# Patient Record
Sex: MALE | Race: WHITE | Employment: FULL TIME | URBAN - METROPOLITAN AREA
[De-identification: names, ages, dates, MRNs, and addresses within clinical notes are randomized per-mention and may not be internally consistent; named-entity substitution may affect disease eponyms.]

---

## 2019-10-24 ENCOUNTER — OFFICE VISIT (OUTPATIENT)
Dept: INTERNAL MEDICINE CLINIC | Facility: CLINIC | Age: 54
End: 2019-10-24

## 2019-10-24 VITALS
RESPIRATION RATE: 18 BRPM | BODY MASS INDEX: 33.03 KG/M2 | WEIGHT: 223 LBS | HEART RATE: 79 BPM | OXYGEN SATURATION: 97 % | HEIGHT: 69 IN | SYSTOLIC BLOOD PRESSURE: 122 MMHG | TEMPERATURE: 98 F | DIASTOLIC BLOOD PRESSURE: 70 MMHG

## 2019-10-24 DIAGNOSIS — E66.9 OBESITY (BMI 30-39.9): ICD-10-CM

## 2019-10-24 DIAGNOSIS — R03.0 ELEVATED BLOOD PRESSURE READING WITHOUT DIAGNOSIS OF HYPERTENSION: ICD-10-CM

## 2019-10-24 DIAGNOSIS — J30.1 SEASONAL ALLERGIC RHINITIS DUE TO POLLEN: ICD-10-CM

## 2019-10-24 DIAGNOSIS — Z00.00 ROUTINE GENERAL MEDICAL EXAMINATION AT A HEALTH CARE FACILITY: ICD-10-CM

## 2019-10-24 DIAGNOSIS — Z23 ENCOUNTER FOR IMMUNIZATION: ICD-10-CM

## 2019-10-24 DIAGNOSIS — Z11.59 NEED FOR HEPATITIS C SCREENING TEST: ICD-10-CM

## 2019-10-24 DIAGNOSIS — Z76.89 ENCOUNTER TO ESTABLISH CARE: Primary | ICD-10-CM

## 2019-10-24 DIAGNOSIS — Z12.5 SCREENING FOR PROSTATE CANCER: ICD-10-CM

## 2019-10-24 DIAGNOSIS — K43.9 ABDOMINAL WALL HERNIA: ICD-10-CM

## 2019-10-24 DIAGNOSIS — G45.8 SUBCLAVIAN STEAL SYNDROME: ICD-10-CM

## 2019-10-24 DIAGNOSIS — Z23 NEED FOR SHINGLES VACCINE: ICD-10-CM

## 2019-10-24 RX ORDER — FLUTICASONE PROPIONATE 50 MCG
SPRAY, SUSPENSION (ML) NASAL
COMMUNITY

## 2019-10-24 NOTE — PROGRESS NOTES
CC:  Chief Complaint   Patient presents with   [address] [facility] Immunization/Injection       HISTORY OF PRESENT ILLNESS  Jhonatan Gross is a 53 y.o. male. Presents to [facility] care. He has seasonal allergic rhinitis. Is concerned about his BP. 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 He denies headaches, CP, or SOB. Reports that his BP is usually lower on one side compared to the other. Denies past history of elevated BP or known family history of HTN. Has a maternal uncle with heart disease and his half-sister had a MI. He complains of left arm pain when he moves left arm back for past year. Felt a pop when he reached back with left arm about a year ago; thinks he may have injured his biceps. Had sebaceous cyst removed from his right shoulder a few years ago and now feels like it is coming back. Soc Hx  Single. Lives with girlfriend. Has no children. Works as a  for [employer]. Never smoker. Drinks 4-6 beers a week. Denies recreational drug use. No regular exercise. Trying to eat healthier. Drinks 3-4 cups of coffee a day. Health Maintenance  Flu vaccine: declined  Tetanus vaccine:  Tdap 10/24/19     Zoster vaccine: discussed, had shingles before, will check on vaccine at his pharmacy  Colonoscopy: due for this  Lipids: will check today  A1c: will check today  Advanced Directives: given information    End of Life: given information      ROS  Constitutional: negative for fevers, chills, night sweats, anorexia and weight loss  Eyes:   negative for visual disturbance and irritation  ENT:   negative for tinnitus, sore throat, nasal congestion, ear pains, hoarseness  Respiratory:  negative for cough, hemoptysis, dyspnea,wheezing  CV:   negative for chest pain, palpitations, lower extremity edema  GI:   negative for nausea, vomiting, diarrhea, constipation, abd pain, melena  Endo: negative for polyuria, polydipsia, polyphagia, cold/heat intolerance  Genitourinary: negative for frequency, dysuria and hematuria  Integument:  negative for rash and pruritus  Hematologic:  negative for easy bruising and gum/nose bleeding  Musculoskel: negative for myalgias, arthralgias, back pain, muscle weakness, joint pain  Neurological:  negative for headaches, dizziness, vertigo, memory problems and gait   Behavl/Psych: negative for feelings of anxiety, depression, mood change      No past medical history on file. Past Surgical History:   Procedure Laterality Date    HX CATARACT REMOVAL Right 06/2018    HX CATARACT REMOVAL Left 2011     Social History     Socioeconomic History    Marital status: SINGLE     Spouse name: Not on file    Number of children: Not on file    Years of education: Not on file    Highest education level: Not on file   Tobacco Use    Smoking status: Never Smoker    Smokeless tobacco: Never Used   Substance and Sexual Activity    Alcohol use: Yes     Frequency: 2-3 times a week     Drinks per session: 3 or 4     Binge frequency: Never     Comment: up to 4 or 5 beers on weekends    Drug use: Never     Family History   Problem Relation Age of Onset    Cancer Mother         lung ca    Heart Attack Sister         half sisters     Allergies   Allergen Reactions    Latex Rash     Current Outpatient Medications   Medication Sig Dispense Refill    fluticasone propionate (FLONASE ALLERGY RELIEF) 50 mcg/actuation nasal spray by Nasal route. PHYSICAL EXAM  Visit Vitals  /68 (BP 1 Location: Left arm, BP Patient Position: Sitting)   Pulse 79   Temp 98 °F (36.7 °C) (Oral)   Resp 18   Ht 5' 9\" (1.753 m)   Wt 223 lb (101.2 kg)   SpO2 97%   BMI 32.93 kg/m²   BP at right arm 147/79, BP at left arm 122/70    General: Obese. In no distress. Alert and oriented to person, place, and time. Head: Normocephalic, atraumatic. Eyes: Conjunctiva clear.  Pupils equal, round, reactive to light. Extraocular movements intact. Ears/Nose: Normal nasal mucosa. Mouth/Throat: Oropharynx benign. Neck: Supple, no lymphadenopathy, no carotid bruits, no JVD, thyroid: not enlarged, symmetric, no tenderness/mass/nodules. Lungs: Clear to auscultation bilaterally. No crackles or wheezes. Chest Wall: No tenderness or deformity. Heart: RRR, normal S1 and S2, no murmur, click, rub, or gallop. Back: ROM normal. No CVA tenderness. Abdomen: Soft, non-distended, bowel sounds normal. No tenderness. Large non-tender right-sided mass when he sits up. No hepatosplenomegaly. Lymph nodes: Cervical and supraclavicular nodes normal.  Extremities: No clubbing, cyanosis, or edema. Skin: Skin color, texture, turgor normal. Soft non-tender mobile 1 cm subcutaneous cyst at top of right shoulder. Pulses: 2+ and symmetric at dorsalis pedis and posterior tibialis pulses. Neurological: CN II-XII intact. Normal strength, sensation, and reflexes throughout. Musculoskeletal: Gait normal. ROM normal at both knees. No left arm tenderness. Mildly decreased left shoulder external rotation. Psychiatric: Normal mood and affect. Behavior is normal.          ASSESSMENT AND PLAN    ICD-10-CM ICD-9-CM    1. Encounter to establish care Z76.89 V65.8    2. Elevated blood pressure reading without diagnosis of hypertension R03.0 796.2    3. Obesity (BMI 30-39. 9) E66.9 278.00    4. Seasonal allergic rhinitis due to pollen J30.1 477.0    5. Routine general medical examination at a health care facility Z00.00 V70.0 LIPID PANEL      METABOLIC PANEL, COMPREHENSIVE      CBC WITH AUTOMATED DIFF      HEMOGLOBIN A1C WITH EAG      TSH RFX ON ABNORMAL TO FREE T4   6. Abdominal wall hernia K43.9 553.20 US ABD COMP   7. Subclavian steal syndrome G45.8 435.2 DUPLEX UPPER EXT ARTERY BILAT   8. Need for hepatitis C screening test Z11.59 V73.89 HEPATITIS C AB   9. Need for shingles vaccine Z23 V04.89    10.  Screening for prostate cancer Z12.5 V76.44 PSA SCREENING (SCREENING)   11. Encounter for immunization Z23 V03.89 TETANUS, DIPHTHERIA TOXOIDS AND ACELLULAR PERTUSSIS VACCINE (TDAP), IN INDIVIDS. >=7, IM      [place] IMMUNIZ ADMIN,1 SINGLE/COMB VAC/TOXOID     Diagnoses and all orders for this visit:    1. Encounter to establish care    2. Elevated blood pressure reading without diagnosis of hypertension  BP normal at 122/70 but     3. Obesity (BMI 30-39. 9)  Discussed the patient's BMI with him. The BMI follow up plan is as follows: dietary management education, guidance, and counseling; encourage exercise; monitor weight; prescribed dietary intake. Follow up BMI in 1 month. 4. Seasonal allergic rhinitis due to pollen  Controlled on Flonase nasal spray. 5. Routine general medical examination at a health care facility  -     LIPID PANEL  -     METABOLIC PANEL, COMPREHENSIVE  -     CBC WITH AUTOMATED DIFF  -     HEMOGLOBIN A1C WITH EAG  -     TSH RFX ON ABNORMAL TO FREE T4    6. Abdominal wall hernia  Rule out right-sided abdominal wall hernia versus loose rectus muscles. -     US ABD COMP; Future    7. Subclavian steal syndrome  Rule out subclavian stenosis based on BP discrepancy between arms and left arm pain (claudication?). -     DUPLEX UPPER EXT ARTERY BILAT; Future    8. Need for hepatitis C screening test  He was born in the window between [facility] and is a candidate for Hepatitis C screening.   -     HEPATITIS C AB    9. Need for shingles vaccine    10. Screening for prostate cancer  -     PSA SCREENING (SCREENING)    11. Encounter for immunization  -     TETANUS, [address] [place] [address] (TDAP), IN INDIVIDS. >=7, IM  -     [place] IMMUNIZ ADMIN,1 SINGLE/COMB VAC/TOXOID      Plan to refer for colonoscopy at next clinic visit. Follow-up and Dispositions    · Return in about 1 month (around 11/24/2019), or if symptoms worsen or fail to improve, for Elevated BP.          I have discussed the diagnosis with the patient and the intended plan as seen in the above orders. Patient is in agreement. The patient has received an after-visit summary and questions were answered concerning future plans. I have discussed medication side effects and warnings with the patient as well.

## 2019-10-24 NOTE — PATIENT INSTRUCTIONS
Vaccine Information Statement Tdap (Tetanus, Diphtheria, Pertussis) Vaccine: What You Need to Know Many Vaccine Information Statements are available in [place] and other languages. See www.immunize.org/vis. Hojas de Información Sobre Vacunas están disponibles en español y en muchos otros idiomas. Visite Duncan[employer].si 1. Why get vaccinated? Tetanus, diphtheria, and pertussis are very serious diseases. Tdap vaccine can protect us from these diseases. And, Tdap vaccine given to pregnant women can protect  babies against pertussis. TETANUS (Lockjaw) is rare in the [facility] today. It causes painful muscle tightening and stiffness, usually all over the body. ? It can lead to tightening of muscles in the head and neck so you cant open your mouth, swallow, or sometimes even breathe. Tetanus kills about 1 out of 10 people who are infected even after receiving the best medical care. DIPHTHERIA is also rare in the [facility] today. It can cause a thick coating to form in the back of the throat. ? It can lead to breathing problems, heart failure, paralysis, and death. PERTUSSIS (Whooping Cough) causes severe coughing spells, which can cause difficulty breathing, vomiting, and disturbed sleep. ? It can also lead to weight loss, incontinence, and rib fractures. Up to 2 in 100 adolescents and 5 in 100 adults with pertussis are hospitalized or have complications, which could include pneumonia or death. These diseases are caused by bacteria. Diphtheria and pertussis are spread from person to person through secretions from coughing or sneezing. Tetanus enters the body through cuts, scratches, or wounds. Before vaccines, as many as 200,000 cases of diphtheria, 200,000 cases of pertussis, and hundreds of cases of tetanus, were reported in the [place] each year.  Since vaccination began, reports of cases for tetanus and diphtheria have dropped by about 99% and for pertussis by about 80%. 2. Tdap vaccine Tdap vaccine can protect adolescents and adults from tetanus, diphtheria, and pertussis. One dose of Tdap is routinely given at age 6 or 15. People who did not get Tdap at that age should get it as soon as possible. Tdap is especially important for health care professionals and anyone having close contact with a baby younger than 12 months. Pregnant women should get a dose of Tdap during every pregnancy, to protect the  from pertussis. Infants are most at risk for severe, life-threatening complications from pertussis. Another vaccine, called Td, protects against tetanus and diphtheria, but not pertussis. A Td booster should be given every 10 years. Tdap may be given as one of these boosters if you have never gotten Tdap before. Tdap may also be given after a severe cut or burn to prevent tetanus infection. Your doctor or the person giving you the vaccine can give you more information. Tdap may safely be given at the same time as other vaccines. 3. Some people should not get this vaccine  A person who has ever had a life-threatening allergic reaction after a previous dose of any diphtheria, tetanus or pertussis containing vaccine, OR has a severe allergy to any part of this vaccine, should not get Tdap vaccine. Tell the person giving the vaccine about any severe allergies.  Anyone who had coma or long repeated seizures within 7 days after a childhood dose of DTP or DTaP, or a previous dose of Tdap, should not get Tdap, unless a cause other than the vaccine was found. They can still get Td.  Talk to your doctor if you: 
- have seizures or another nervous system problem, 
- had severe pain or swelling after any vaccine containing diphtheria, tetanus or pertussis,  
- ever had a condition called Guillain Barré Syndrome (GBS), 
- arent feeling well on the day the shot is scheduled. 4. Risks With any medicine, including vaccines, there is a chance of side effects. These are usually mild and go away on their own. Serious reactions are also possible but are rare. Most people who get Tdap vaccine do not have any problems with it. Mild Problems following Tdap 
(Did not interfere with activities)  Pain where the shot was given (about 3 in 4 adolescents or 2 in 3 adults)  Redness or swelling where the shot was given (about 1 person in 5)  Mild fever of at least 100.4°F (up to about 1 in 25 adolescents or 1 in 100 adults)  Headache (about 3 or 4 people in 10)  Tiredness (about 1 person in 3 or 4)  Nausea, vomiting, diarrhea, stomach ache (up to 1 in 4 adolescents or 1 in 10 adults)  Chills,  sore joints (about 1 person in 10)  Body aches (about 1 person in 3 or 4)  Rash, swollen glands (uncommon) Moderate Problems following Tdap (Interfered with activities, but did not require medical attention)  Pain where the shot was given (up to 1 in 5 or 6)  Redness or swelling where the shot was given (up to about 1 in 16 adolescents or 1 in 12 adults)  Fever over 102°F (about 1 in 100 adolescents or 1 in 250 adults)  Headache (about 1 in 7 adolescents or 1 in 10 adults)  Nausea, vomiting, diarrhea, stomach ache (up to 1 or 3 people in 100)  Swelling of the entire arm where the shot was given (up to about 1 in 500). Severe Problems following Tdap 
(Unable to perform usual activities; required medical attention)  Swelling, severe pain, bleeding, and redness in the arm where the shot was given (rare). Problems that could happen after any vaccine:  People sometimes faint after a medical procedure, including vaccination. Sitting or lying down for about 15 minutes can help prevent fainting, and injuries caused by a fall. Tell your doctor if you feel dizzy, or have vision changes or ringing in the ears.  Some people get severe pain in the shoulder and have difficulty moving the arm where a shot was given. This happens very rarely.  Any medication can cause a severe allergic reaction. Such reactions from a vaccine are very rare, estimated at fewer than 1 in a million doses, and would happen within a few minutes to a few hours after the vaccination. As with any medicine, there is a very remote chance of a vaccine causing a serious injury or death. The safety of vaccines is always being monitored. For more information, visit: www.cdc.gov/vaccinesafety/ 
 
5. What if there is a serious problem? What should I look for?  Look for anything that concerns you, such as signs of a severe allergic reaction, very high fever, or unusual behavior.  Signs of a severe allergic reaction can include hives, swelling of the face and throat, difficulty breathing, a fast heartbeat, dizziness, and weakness. These would usually start a few minutes to a few hours after the vaccination. What should I do?  If you think it is a severe allergic reaction or other emergency that cant wait, call 9-1-1 or get the person to the nearest hospital. Otherwise, call your doctor.  Afterward, the reaction should be reported to the Vaccine Adverse Event Reporting System (VAERS). Your doctor might file this report, or you can do it yourself through the VAERS web site at www.vaers. [facility].gov, or by calling 2-279.779.8049. VAERS does not give medical advice. 6. The National Vaccine Injury Compensation Program 
 
The [employer] Vaccine Injury Compensation Program (VICP) is a federal program that was created to compensate people who may have been injured by certain vaccines. Persons who believe they may have been injured by a vaccine can learn about the program and about filing a claim by calling 3-377.988.8229 or visiting the [employer] website at www.[facility].gov/vaccinecompensation.  There is a time limit to file a claim for compensation. 7. How can I learn more?  Ask your doctor. He or she can give you the vaccine package insert or suggest other sources of information.  Call your local or state health department.  Contact the Centers for Disease Control and Prevention (CDC): 
- Call 5-913.966.7505 (1-800-CDC-INFO) or 
- Visit CDCs website at www.cdc.gov/vaccines Vaccine Information Statement Tdap Vaccine 
(2/24/2015) [address] 329RG-87 Department of [facility] and [place] Centers for Disease Control and Prevention Office Use Only

## 2019-10-24 NOTE — PROGRESS NOTES
Ned Telles  Identified pt with two pt identifiers(name and ). Chief Complaint   Patient presents with   [address] [facility] [address] Immunization/Injection   Per Dr. Coombs Every verbal order read back orders placed for TDAP. Declines flu vaccien      Patient received TDAP 0.5 ml  in right deltoid IM. Patient tolerated procedure without complaints and received VIS. 1. Have you been to the ER, urgent care clinic since your last visit? Hospitalized since your last visit? NO    2. Have you seen or consulted any other health care providers outside of the [address] [place] [address] since your last visit? Include any pap smears or colon screening. NO    Today's provider has been notified of reason for visit, vitals and flowsheets obtained on patients.        Reviewed record In preparation for visit, huddled with provider and have obtained necessary documentation      Health Maintenance Due   Topic    Hepatitis C Screening     DTaP/Tdap/Td series (1 - Tdap)    Shingrix Vaccine Age 50> (1 of 2)    FOBT Q 1 YEAR AGE 50-75     Influenza Age 5 to Adult        Wt Readings from Last 3 Encounters:   10/24/19 223 lb (101.2 kg)     Temp Readings from Last 3 Encounters:   10/24/19 98 °F (36.7 °C) (Oral)     BP Readings from Last 3 Encounters:   10/24/19 133/68     Pulse Readings from Last 3 Encounters:   10/24/19 79     Vitals:    10/24/19 0823   BP: 133/68   Pulse: 79   Resp: 18   Temp: 98 °F (36.7 °C)   TempSrc: Oral   SpO2: 97%   Weight: 223 lb (101.2 kg)   Height: 5' 9\" (1.753 m)   PainSc:   3   PainLoc: Arm         Learning Assessment:  :     Learning Assessment 10/24/2019   PRIMARY LEARNER Patient   PRIMARY LANGUAGE ENGLISH   LEARNER PREFERENCE PRIMARY LISTENING   ANSWERED BY patient s   RELATIONSHIP SELF       Depression Screening:  :     3 most recent PHQ Screens 10/24/2019   Little interest or pleasure in doing things Not at all   Feeling down, depressed, irritable, or hopeless Not at all   Total Score PHQ 2 0       Fall Risk Assessment:  :     Fall Risk Assessment, last 12 mths 10/24/2019   Able to walk? Yes   Fall in past 12 months? No       Abuse Screening:  :     Abuse Screening Questionnaire 10/24/2019   Do you ever feel afraid of your partner? N   Are you in a relationship with someone who physically or mentally threatens you? N   Is it safe for you to go home? Y       ADL Screening:  :     ADL Assessment 10/24/2019   Feeding yourself No Help Needed   Getting from bed to chair No Help Needed   Getting dressed No Help Needed   Bathing or showering No Help Needed   Walk across the room (includes cane/walker) No Help Needed   Using the telphone No Help Needed   Taking your medications No Help Needed   Preparing meals No Help Needed   Managing money (expenses/bills) No Help Needed   Moderately strenuous housework (laundry) No Help Needed   Shopping for personal items (toiletries/medicines) No Help Needed   Shopping for groceries No Help Needed   Driving No Help Needed   Climbing a flight of stairs No Help Needed   Getting to places beyond walking distances No Help Needed                 Medication reconciliation up to date and corrected with patient at this time.

## 2019-10-25 LAB
ALBUMIN SERPL-MCNC: 4.4 G/DL (ref 3.5–5.5)
ALBUMIN/GLOB SERPL: 1.5 {RATIO} (ref 1.2–2.2)
ALP SERPL-CCNC: 91 IU/L (ref 39–117)
ALT SERPL-CCNC: 16 IU/L (ref 0–44)
AST SERPL-CCNC: 16 IU/L (ref 0–40)
BASOPHILS # BLD AUTO: 0 X10E3/UL (ref 0–0.2)
BASOPHILS NFR BLD AUTO: 0 %
BILIRUB SERPL-MCNC: 0.5 MG/DL (ref 0–1.2)
BUN SERPL-MCNC: 16 MG/DL (ref 6–24)
BUN/CREAT SERPL: 19 (ref 9–20)
CALCIUM SERPL-MCNC: 9.5 MG/DL (ref 8.7–10.2)
CHLORIDE SERPL-SCNC: 99 MMOL/L (ref 96–106)
CHOLEST SERPL-MCNC: 240 MG/DL (ref 100–199)
CO2 SERPL-SCNC: 27 MMOL/L (ref 20–29)
CREAT SERPL-MCNC: 0.84 MG/DL (ref 0.76–1.27)
EOSINOPHIL # BLD AUTO: 0.1 X10E3/UL (ref 0–0.4)
EOSINOPHIL NFR BLD AUTO: 2 %
ERYTHROCYTE [DISTWIDTH] IN BLOOD BY AUTOMATED COUNT: 12.5 % (ref 12.3–15.4)
EST. AVERAGE GLUCOSE BLD GHB EST-MCNC: 229 MG/DL
GLOBULIN SER CALC-MCNC: 2.9 G/DL (ref 1.5–4.5)
GLUCOSE SERPL-MCNC: 204 MG/DL (ref 65–99)
HBA1C MFR BLD: 9.6 % (ref 4.8–5.6)
HCT VFR BLD AUTO: 45 % (ref 37.5–51)
HCV AB S/CO SERPL IA: <0.1 S/CO RATIO (ref 0–0.9)
HDLC SERPL-MCNC: 39 MG/DL
HGB BLD-MCNC: 15.5 G/DL (ref 13–17.7)
IMM GRANULOCYTES # BLD AUTO: 0 X10E3/UL (ref 0–0.1)
IMM GRANULOCYTES NFR BLD AUTO: 0 %
LDLC SERPL CALC-MCNC: 167 MG/DL (ref 0–99)
LYMPHOCYTES # BLD AUTO: 1.3 X10E3/UL (ref 0.7–3.1)
LYMPHOCYTES NFR BLD AUTO: 19 %
MCH RBC QN AUTO: 29.8 PG (ref 26.6–33)
MCHC RBC AUTO-ENTMCNC: 34.4 G/DL (ref 31.5–35.7)
MCV RBC AUTO: 87 FL (ref 79–97)
MONOCYTES # BLD AUTO: 0.6 X10E3/UL (ref 0.1–0.9)
MONOCYTES NFR BLD AUTO: 8 %
NEUTROPHILS # BLD AUTO: 4.8 X10E3/UL (ref 1.4–7)
NEUTROPHILS NFR BLD AUTO: 71 %
PLATELET # BLD AUTO: 180 X10E3/UL (ref 150–450)
POTASSIUM SERPL-SCNC: 4.6 MMOL/L (ref 3.5–5.2)
PROT SERPL-MCNC: 7.3 G/DL (ref 6–8.5)
PSA SERPL-MCNC: 1.7 NG/ML (ref 0–4)
RBC # BLD AUTO: 5.2 X10E6/UL (ref 4.14–5.8)
SODIUM SERPL-SCNC: 139 MMOL/L (ref 134–144)
TRIGL SERPL-MCNC: 168 MG/DL (ref 0–149)
TSH SERPL DL<=0.005 MIU/L-ACNC: 0.89 UIU/ML (ref 0.45–4.5)
VLDLC SERPL CALC-MCNC: 34 MG/DL (ref 5–40)
WBC # BLD AUTO: 6.8 X10E3/UL (ref 3.4–10.8)

## 2019-10-29 NOTE — PROGRESS NOTES
Your labs showed normal kidney and liver tests, blood counts, thyroid, prostate blood test (PSA), and hepatitis C screening test.  Your blood sugar was high, and your diabetes screening test showed you have full-blown type 2 diabetes (A1c 9.6%). Your average blood glucose over the past 3 months was 229; normal is less than 130. Your cholesterol was high at 240, normal is less than 200. Dr. Bin Henry would like to see you back in clinic next week to discuss these lab results more and to discuss the results of your ultrasound studies.

## 2019-10-31 ENCOUNTER — HOSPITAL ENCOUNTER (OUTPATIENT)
Dept: ULTRASOUND IMAGING | Age: 54
Discharge: HOME | End: 2019-10-31
Attending: INTERNAL MEDICINE
Payer: COMMERCIAL

## 2019-10-31 ENCOUNTER — HOSPITAL ENCOUNTER (OUTPATIENT)
Dept: VASCULAR SURGERY | Age: 54
Discharge: HOME | End: 2019-10-31
Attending: INTERNAL MEDICINE
Payer: COMMERCIAL

## 2019-10-31 DIAGNOSIS — K43.9 ABDOMINAL WALL HERNIA: ICD-10-CM

## 2019-10-31 DIAGNOSIS — G45.8 SUBCLAVIAN STEAL SYNDROME: ICD-10-CM

## 2019-10-31 LAB
LEFT ARM BP: 150 MMHG
LEFT DIST BRACHIAL A EDV: 0 CM/S
LEFT DIST BRACHIAL A PSV: 81.3 CM/S
LEFT DIST RADIAL A PSV: 99 CM/S
LEFT DIST ULNAR A PSV: 104.2 CM/S
LEFT PROX BRACHIAL A EDV: 10.5 CM/S
LEFT PROX BRACHIAL A PSV: 97.3 CM/S
LEFT SUBCLAVIAN DIAS: 10 CM/S
LEFT SUBCLAVIAN SYS: 97.2 CM/S
RIGHT ARM BP: 140 MMHG
RIGHT DIST BRACHIAL A EDV: 0 CM/S
RIGHT DIST BRACHIAL A PSV: 78.1 CM/S
RIGHT DIST RADIAL A PSV: 87.5 CM/S
RIGHT DIST ULNAR A PSV: 79.7 CM/S
RIGHT MID AX A PSV: 66 CM/S
RIGHT PROX BRACHIAL A EDV: 0 CM/S
RIGHT PROX BRACHIAL A PSV: 72.9 CM/S

## 2019-10-31 PROCEDURE — 93930 UPPER EXTREMITY STUDY: CPT

## 2019-10-31 PROCEDURE — 76705 ECHO EXAM OF ABDOMEN: CPT

## 2019-11-01 NOTE — PROGRESS NOTES
Message sent to patient by My Chart:  The ultrasound of your upper extremities showed no blockages in the arteries to explain the difference in the blood pressure readings in your arms or to explain the left arm pain.     Dr. Moshe Mcwilliams

## 2019-11-01 NOTE — PROGRESS NOTES
Message sent to patient by My Chart:  The ultrasound of your abdomen showed you have a wide mouth ventral hernia. It also showed possible breakdown in the abdominal wall. Let me know if you are having any pain in the area. If so, you need to see a surgeon for possible surgical repair. If not, we can just keep an eye on it for now.     Dr. Levy Robbins

## 2019-11-05 NOTE — PROGRESS NOTES
Spoke with patient and after verifying name and date of birth of patient gave patient test results and any instructions in note per provider. Patient stated understanding. Scheduled follow up appointment for patient soon as he is able to come in to office.

## 2019-11-13 ENCOUNTER — OFFICE VISIT (OUTPATIENT)
Dept: INTERNAL MEDICINE CLINIC | Facility: CLINIC | Age: 54
End: 2019-11-13

## 2019-11-13 VITALS
HEART RATE: 85 BPM | SYSTOLIC BLOOD PRESSURE: 134 MMHG | WEIGHT: 222.6 LBS | HEIGHT: 69 IN | RESPIRATION RATE: 18 BRPM | DIASTOLIC BLOOD PRESSURE: 77 MMHG | OXYGEN SATURATION: 98 % | BODY MASS INDEX: 32.97 KG/M2 | TEMPERATURE: 98.3 F

## 2019-11-13 DIAGNOSIS — E66.9 OBESITY (BMI 30-39.9): ICD-10-CM

## 2019-11-13 DIAGNOSIS — K43.9 ABDOMINAL WALL HERNIA: ICD-10-CM

## 2019-11-13 DIAGNOSIS — E78.2 MIXED HYPERLIPIDEMIA: ICD-10-CM

## 2019-11-13 DIAGNOSIS — E11.9 TYPE 2 DIABETES MELLITUS WITHOUT COMPLICATION, WITHOUT LONG-TERM CURRENT USE OF INSULIN (HCC): Primary | ICD-10-CM

## 2019-11-13 DIAGNOSIS — R03.0 ELEVATED BLOOD PRESSURE READING WITHOUT DIAGNOSIS OF HYPERTENSION: ICD-10-CM

## 2019-11-13 LAB
GLUCOSE POC: 197 MG/DL
HBA1C MFR BLD HPLC: 9.3 %

## 2019-11-13 RX ORDER — METFORMIN HYDROCHLORIDE 500 MG/1
500 TABLET, EXTENDED RELEASE ORAL
Qty: 30 TAB | Refills: 1 | Status: SHIPPED | OUTPATIENT
Start: 2019-11-13 | End: 2020-03-17 | Stop reason: SDUPTHER

## 2019-11-13 NOTE — PATIENT INSTRUCTIONS
Learning About Meal Planning for Diabetes Why plan your meals? Meal planning can be a key part of managing diabetes. Planning meals and snacks with the right balance of carbohydrate, protein, and fat can help you keep your blood sugar at the target level you set with your doctor. You don't have to eat special foods. You can eat what your family eats, including sweets once in a while. But you do have to pay attention to how often you eat and how much you eat of certain foods. You may want to work with a dietitian or a certified diabetes educator. He or she can give you tips and meal ideas and can answer your questions about meal planning. This health professional can also help you reach a healthy weight if that is one of your goals. What plan is right for you? Your dietitian or diabetes educator may suggest that you start with the plate format or carbohydrate counting. The plate format The plate format is a simple way to help you manage how you eat. You plan meals by learning how much space each food should take on a plate. Using the plate format helps you spread carbohydrate throughout the day. It can make it easier to keep your blood sugar level within your target range. It also helps you see if you're eating healthy portion sizes. To use the plate format, you put non-starchy vegetables on half your plate. Add meat or meat substitutes on one-quarter of the plate. Put a grain or starchy vegetable (such as brown rice or a potato) on the final quarter of the plate. You can add a small piece of fruit and some low-fat or fat-free milk or yogurt, depending on your carbohydrate goal for each meal. 
Here are some tips for using the plate format: · Make sure that you are not using an oversized plate. A 9-inch plate is best. Many restaurants use larger plates. · Get used to using the plate format at home. Then you can use it when you eat out. · Write down your questions about using the plate format. Talk to your doctor, a dietitian, or a diabetes educator about your concerns. Carbohydrate counting With carbohydrate counting, you plan meals based on the amount of carbohydrate in each food. Carbohydrate raises blood sugar higher and more quickly than any other nutrient. It is found in desserts, breads and cereals, and fruit. It's also found in starchy vegetables such as potatoes and corn, grains such as rice and pasta, and milk and yogurt. Spreading carbohydrate throughout the day helps keep your blood sugar levels within your target range. Your daily amount depends on several things, including your weight, how active you are, which diabetes medicines you take, and what your goals are for your blood sugar levels. A registered dietitian or diabetes educator can help you plan how much carbohydrate to include in each meal and snack. A guideline for your daily amount of carbohydrate is: · 45 to 60 grams at each meal. That's about the same as 3 to 4 carbohydrate servings. · 15 to 20 grams at each snack. That's about the same as 1 carbohydrate serving. The Nutrition Facts label on packaged foods tells you how much carbohydrate is in a serving of the food. First, look at the serving size on the food label. Is that the amount you eat in a serving? All of the nutrition information on a food label is based on that serving size. So if you eat more or less than that, you'll need to adjust the other numbers. Total carbohydrate is the next thing you need to look for on the label. If you count carbohydrate servings, one serving of carbohydrate is 15 grams. For foods that don't come with labels, such as fresh fruits and vegetables, you'll need a guide that lists carbohydrate in these foods. Ask your doctor, dietitian, or diabetes educator about books or other nutrition guides you can use.  
If you take insulin, you need to know how many grams of carbohydrate are in a meal. This lets you know how much rapid-acting insulin to take before you eat. If you use an insulin pump, you get a constant rate of insulin during the day. So the pump must be programmed at meals to give you extra insulin to cover the rise in blood sugar after meals. When you know how much carbohydrate you will eat, you can take the right amount of insulin. Or, if you always use the same amount of insulin, you need to make sure that you eat the same amount of carbohydrate at meals. If you need more help to understand carbohydrate counting and food labels, ask your doctor, dietitian, or diabetes educator. How do you get started with meal planning? Here are some tips to get started: 
· Plan your meals a week at a time. Don't forget to include snacks too. · Use cookbooks or online recipes to plan several main meals. Plan some quick meals for busy nights. You also can double some recipes that freeze well. Then you can save half for other busy nights when you don't have time to cook. · Make sure you have the ingredients you need for your recipes. If you're running low on basic items, put these items on your shopping list too. · List foods that you use to make breakfasts, lunches, and snacks. List plenty of fruits and vegetables. · Post this list on the refrigerator. Add to it as you think of more things you need. · Take the list to the store to do your weekly shopping. Follow-up care is a key part of your treatment and safety. Be sure to make and go to all appointments, and call your doctor if you are having problems. It's also a good idea to know your test results and keep a list of the medicines you take. Where can you learn more? Go to http://norbert-luciano.info/. Sam Shaffer in the search box to learn more about \"Learning About Meal Planning for Diabetes. \" Current as of: April 16, 2019 Content Version: 12.2 © 0170-9088 [employer].  Care instructions adapted under license by [address] (which disclaims liability or warranty for this information). If you have questions about a medical condition or this instruction, always ask your healthcare professional. [address] any warranty or liability for your use of this information. Learning About Tests When You Have Diabetes Why do you need regular diabetes tests? Diabetes can be hard on your body if it's not well controlled. But having tests on a regular schedule can help you and your doctor find problems early, when it's easier to start managing them. What tests do you need? The tests you may have, how often you should have them, and the goals of the tests are: 
A1c blood test. This test shows the average level of blood sugar over the past 2 to 3 months. It helps your doctor see whether blood sugar levels have been staying within your target range. · How often: Every 3 to 6 months · Goal: A blood sugar level in your target range Blood pressure test: This test measures the pressure of blood flow in the arteries. Controlling blood pressure can help prevent damage to nerves and blood vessels. · How often: Every 3 to 6 months · Goal: A blood pressure level in your target range Cholesterol test: This test measures the amount of a type of fat in the blood. It is common for people with diabetes to also have high cholesterol. Too much cholesterol in the blood can build up inside the blood vessels and raise the risk for heart attack and stroke. · How often: At the time of your diabetes diagnosis, and as often as your doctor recommends after that · Goal: A cholesterol level in your target range Albumin-creatinine ratio test: This test checks for kidney damage by looking for the protein albumin (say \"al-BYOO-ja\") in the urine. Albumin is normally found in the blood. Kidney damage can let small amounts of it (microalbumin) leak into the urine. · How often: Once a year · Goal: No protein in the urine Blood creatinine test/estimated glomerular filtration (eGFR): The blood creatinine (say \"icqi-AE-mf-neen\") level shows how well your kidneys are working. Creatinine is a waste product that muscles release into the blood. Blood creatinine is used to estimate the glomerular filtration rate. A high level of creatinine and/or a low eGFR may mean your kidneys are not working as well as they should. · How often: Once a year · Goal: Normal level of creatinine in the blood. The eGFR goal is greater than 60 mL/min/1.73 m². Complete foot exam: The doctor checks for foot sores and whether any sensation has been lost. 
· How often: Once a year · Goal: Healthy feet with no foot ulcers or loss of feeling Dental exam and cleaning: The dentist checks for gum disease and tooth decay. People with high blood sugar are more likely to have these problems. · How often: Every 6 months · Goal: Healthy teeth and gums Complete eye exam: High blood sugar levels can damage the eyes. This exam is done by an ophthalmologist or optometrist. It includes a dilated eye exam. The exam shows whether there's damage to the back of the eye (diabetic retinopathy). · How often: Once a year. If you don't have any signs of diabetic retinopathy, your doctor may recommend an exam every 2 years. · Goal: No damage to the back of the eye Thyroid-stimulating hormone (TSH) blood test: This test checks for thyroid disease. Too little thyroid hormone can cause some medicines (like insulin) to stay in the body longer. This can cause low blood sugar. You may be tested if you have high cholesterol or are a woman over 48years old. · How often: As part of your diabetes diagnosis, and as often as your doctor recommends after that · Goal: Normal level of TSH in the blood Follow-up care is a key part of your treatment and safety.  Be sure to make and go to all appointments, and call your doctor if you are having problems. It's also a good idea to know your test results and keep a list of the medicines you take. Where can you learn more? Go to http://norbert-luciano.info/. Enter [id number] in the search box to learn more about \"Learning About Tests When You Have Diabetes. \" Current as of: April 16, 2019 Content Version: 12.2 © 6186-5826 [employer]. Care instructions adapted under license by [employer] (which disclaims liability or warranty for this information). If you have questions about a medical condition or this instruction, always ask your healthcare professional. [address] any warranty or liability for your use of this information.

## 2019-11-13 NOTE — PROGRESS NOTES
Heather Wen  Identified pt with two pt identifiers(name and ). Chief Complaint   Patient presents with    Results       1. Have you been to the ER, urgent care clinic since your last visit? Hospitalized since your last visit? NO    2. Have you seen or consulted any other health care providers outside of the [address] [place] [address] since your last visit? Include any pap smears or colon screening. NO    Today's provider has been notified of reason for visit, vitals and flowsheets obtained on patients.      Patient received paperwork for advance directive during previous visit but has not completed at this time     Reviewed record In preparation for visit, huddled with provider and have obtained necessary documentation      Health Maintenance Due   Topic    FOOT EXAM Q1     MICROALBUMIN Q1     EYE EXAM RETINAL OR DILATED     Shingrix Vaccine Age 50> (1 of 2)    FOBT Q 1 YEAR AGE 50-75        Wt Readings from Last 3 Encounters:   19 222 lb 9.6 oz (101 kg)   10/24/19 223 lb (101.2 kg)     Temp Readings from Last 3 Encounters:   19 98.3 °F (36.8 °C) (Oral)   10/24/19 98 °F (36.7 °C) (Oral)     BP Readings from Last 3 Encounters:   19 134/77   10/24/19 122/70     Pulse Readings from Last 3 Encounters:   19 85   10/24/19 79     Vitals:    19 1415   BP: 134/77   Pulse: 85   Resp: 18   Temp: 98.3 °F (36.8 °C)   TempSrc: Oral   SpO2: 98%   Weight: 222 lb 9.6 oz (101 kg)   Height: 5' 9\" (1.753 m)   PainSc:   3   PainLoc: Arm         Learning Assessment:  :     Learning Assessment 10/24/2019   PRIMARY LEARNER Patient   PRIMARY LANGUAGE ENGLISH   LEARNER PREFERENCE PRIMARY LISTENING   ANSWERED BY patient s   RELATIONSHIP SELF       Depression Screening:  :     3 most recent PHQ Screens 10/24/2019   Little interest or pleasure in doing things Not at all   Feeling down, depressed, irritable, or hopeless Not at all   Total Score PHQ 2 0       Fall Risk Assessment:  :     Fall Risk Assessment, last 12 mths 10/24/2019   Able to walk? Yes   Fall in past 12 months? No       Abuse Screening:  :     Abuse Screening Questionnaire 10/24/2019   Do you ever feel afraid of your partner? N   Are you in a relationship with someone who physically or mentally threatens you? N   Is it safe for you to go home? Y       ADL Screening:  :     ADL Assessment 10/24/2019   Feeding yourself No Help Needed   Getting from bed to chair No Help Needed   Getting dressed No Help Needed   Bathing or showering No Help Needed   Walk across the room (includes cane/walker) No Help Needed   Using the telphone No Help Needed   Taking your medications No Help Needed   Preparing meals No Help Needed   Managing money (expenses/bills) No Help Needed   Moderately strenuous housework (laundry) No Help Needed   Shopping for personal items (toiletries/medicines) No Help Needed   Shopping for groceries No Help Needed   Driving No Help Needed   Climbing a flight of stairs No Help Needed   Getting to places beyond walking distances No Help Needed                 Medication reconciliation up to date and corrected with patient at this time.

## 2019-11-13 NOTE — PROGRESS NOTES
CC:   Chief Complaint   Patient presents with    Results       HISTORY OF PRESENT ILLNESS  Marva Sheehan is a 53 y.o. male. Presents to discuss lab and imaging results. Labs from 10/24/19 showed A1c 9.6%, consistent with newly diagnosed type 2 DM, tot chol 240, . Today he reports polyuria and fatigue but denies polydipsia and blurred vision. Thought his polyuria was due to drinking a lot of coffee that his fatigue was from working hard. Since being informed of his diagnosis by phone on 19, he has been eating more fruits and vegetables and less starchy foods and sweets. Has started exercising by riding his bike from 1-3 miles a day. His girlfriend is a nurse and has been helping him. No known family history of DM but knows nothing about his father's side of the family. US abd 10/31/19: wide mouth ventral hernia. He denies pain at hernia site. Duplex UE's 10/31/19 was normal, showed no subclavian stenosis. Also presents for BP check. Brought in home BP lo-142/70-85. Had on isolated BP reading of 147/87. Denies headaches, CP, or SOB. Soc Hx  Single. Lives with girlfriend who is a nurse. Has no children. Works as a  for [employer]. Never smoker. Used to drink 4 beers a day 3 times a week; has decreased to 2 beers 3 times a week. Denies recreational drug use. Exercises by biking. Drinks 3-4 cups of coffee a day. ROS  A complete review of systems was performed and is negative except for those mentioned in the HPI. Patient Active Problem List   Diagnosis Code    Elevated blood pressure reading without diagnosis of hypertension R03.0    Obesity (BMI 30-39. 9) E66.9    Seasonal allergic rhinitis due to pollen J30.1    Abdominal wall hernia K43.9     No past medical history on file.   Allergies   Allergen Reactions    Latex Rash       Current Outpatient Medications   Medication Sig Dispense Refill    fluticasone propionate (FLONASE ALLERGY RELIEF) 50 mcg/actuation nasal spray by Nasal route. PHYSICAL EXAM  Visit Vitals  /77 (BP 1 Location: Left arm, BP Patient Position: Sitting)   Pulse 85   Temp 98.3 °F (36.8 °C) (Oral)   Resp 18   Ht 5' 9\" (1.753 m)   Wt 222 lb 9.6 oz (101 kg)   SpO2 98%   BMI 32.87 kg/m²       General: Obese, no distress. HEENT:  Head normocephalic/atraumatic, no scleral icterus  Lungs: Clear to auscultation bilaterally. No crackles or wheezes. Heart: RRR, normal S1 and S2, no murmur, click, rub, or gallop. Abdomen: Soft, non-distended, bowel sounds normal. No tenderness. Large, soft non-tender right-sided mass. No hepatosplenomegaly. Extremities: No clubbing, cyanosis, or edema. Diabetic foot exam:     Left Foot:   Visual Exam: callous - located at side of great toe   Pulse DP: 2+ (normal)   Filament test: normal sensation    Vibratory sensation: normal      Right Foot:   Visual Exam: callous - located at side of great toe   Pulse DP: 2+ (normal)   Filament test: normal sensation    Vibratory sensation: normal      Results for orders placed or performed in visit on 11/13/19   AMB POC HEMOGLOBIN A1C   Result Value Ref Range    Hemoglobin A1c (POC) 9.3 %   AMB POC GLUCOSE BLOOD, BY GLUCOSE MONITORING DEVICE   Result Value Ref Range    Glucose  mg/dL         ASSESSMENT AND PLAN    ICD-10-CM ICD-9-CM    1. Type 2 diabetes mellitus without complication, without long-term current use of insulin ([facility]) E11.9 250.00  DIABETES FOOT EXAM      MICROALBUMIN, UR, RAND W/ MICROALB/CREAT RATIO      REFERRAL TO OPHTHALMOLOGY      metFORMIN ER (GLUCOPHAGE XR) 500 mg tablet      REFERRAL TO DIETITIAN      AMB POC HEMOGLOBIN A1C      AMB POC GLUCOSE BLOOD, BY GLUCOSE MONITORING DEVICE   2. Elevated blood pressure reading without diagnosis of hypertension R03.0 796.2    3. Obesity (BMI 30-39. 9) E66.9 278.00    4. Abdominal wall hernia K43.9 553.20    5.  Mixed hyperlipidemia E78.2 272.2      Diagnoses and all orders for this visit:    1. Type 2 diabetes mellitus without complication, without long-term current use of insulin ([address] recently diagnosed  Repeat A1c 9.3%, confirming diagnosis of DM. Patient educated on type 2 DM and possible complications. Start metformin, check urine microalbumin, and refer to diabetic dietitian and eye doctor. -     Start metFORMIN ER (GLUCOPHAGE XR) 500 mg tablet; Take 1 Tab by mouth daily (with dinner). -      DIABETES FOOT EXAM  -     MICROALBUMIN, UR, RAND W/ MICROALB/CREAT RATIO  -     REFERRAL TO OPHTHALMOLOGY (Dr. Barrett Farmer)  -     REFERRAL TO DIETITIAN  -     AMB POC HEMOGLOBIN A1C  -     AMB POC GLUCOSE BLOOD, BY GLUCOSE MONITORING DEVICE    2. Elevated blood pressure reading without diagnosis of hypertension  BP well-controlled. No evidence of HTN. 3. Obesity (BMI 30-39. 9)  Discussed the patient's BMI with him  The BMI follow up plan is as follows: dietary management education, guidance, and counseling; encourage exercise; monitor weight; prescribed dietary intake. Follow up BMI in 3 months. 4. Abdominal wall hernia  Asymptomatic. 5. Mixed hyperlipidemia  Patient declined starting on cholesterol-lowering medication although recommended. He wants to work on lifestyle modification first.      Follow-up and Dispositions    · Return in about 1 month (around 12/13/2019), or if symptoms worsen or fail to improve, for DM. I have discussed the diagnosis with the patient and the intended plan as seen in the above orders. Patient is in agreement. The patient has received an after-visit summary and questions were answered concerning future plans. I have discussed medication side effects and warnings with the patient as well.

## 2019-11-14 LAB
ALBUMIN/CREAT UR: 5.1 MG/G CREAT (ref 0–30)
CREAT UR-MCNC: 113.6 MG/DL
MICROALBUMIN UR-MCNC: 5.8 UG/ML

## 2019-11-15 NOTE — PROGRESS NOTES
Message sent to patient by My Chart:  Your urine protein test returned normal.    Dr. Jennifer Richard

## 2019-12-13 ENCOUNTER — OFFICE VISIT (OUTPATIENT)
Dept: INTERNAL MEDICINE CLINIC | Facility: CLINIC | Age: 54
End: 2019-12-13

## 2019-12-13 VITALS
BODY MASS INDEX: 31.81 KG/M2 | RESPIRATION RATE: 14 BRPM | HEART RATE: 60 BPM | HEIGHT: 69 IN | DIASTOLIC BLOOD PRESSURE: 65 MMHG | OXYGEN SATURATION: 98 % | SYSTOLIC BLOOD PRESSURE: 104 MMHG | TEMPERATURE: 98 F | WEIGHT: 214.8 LBS

## 2019-12-13 DIAGNOSIS — Z12.11 SCREENING FOR COLON CANCER: ICD-10-CM

## 2019-12-13 DIAGNOSIS — E66.9 OBESITY (BMI 30-39.9): ICD-10-CM

## 2019-12-13 DIAGNOSIS — E78.2 MIXED HYPERLIPIDEMIA: ICD-10-CM

## 2019-12-13 DIAGNOSIS — E11.9 TYPE 2 DIABETES MELLITUS WITHOUT COMPLICATION, WITHOUT LONG-TERM CURRENT USE OF INSULIN (HCC): Primary | ICD-10-CM

## 2019-12-13 LAB — GLUCOSE POC: 118 MG/DL

## 2019-12-13 NOTE — PROGRESS NOTES
Marva Sheehan  Identified pt with two pt identifiers(name and ). Chief Complaint   Patient presents with    Diabetes     Room 3B// eye exam this am- brought copy      Waist circumference 40.5\"    1. Have you been to the ER, urgent care clinic since your last visit? Hospitalized since your last visit? NO    2. Have you seen or consulted any other health care providers outside of the [address] [place] [address] since your last visit? Include any pap smears or colon screening. NO      Provider notified of reason for visit, vitals and flowsheets obtained on patients.      Patient received paperwork for advance directive during previous visit but has not completed at this time     Reviewed record In preparation for visit, huddled with provider and have obtained necessary documentation      Health Maintenance Due   Topic    EYE EXAM RETINAL OR DILATED     FOBT Q 1 YEAR AGE 50-75        Wt Readings from Last 3 Encounters:   19 214 lb 12.8 oz (97.4 kg)   19 222 lb 9.6 oz (101 kg)   10/24/19 223 lb (101.2 kg)     Temp Readings from Last 3 Encounters:   19 98 °F (36.7 °C) (Oral)   19 98.3 °F (36.8 °C) (Oral)   10/24/19 98 °F (36.7 °C) (Oral)     BP Readings from Last 3 Encounters:   19 104/65   19 134/77   10/24/19 122/70     Pulse Readings from Last 3 Encounters:   19 60   19 85   10/24/19 79     Vitals:    19 1437   BP: 104/65   Pulse: 60   Resp: 14   Temp: 98 °F (36.7 °C)   TempSrc: Oral   SpO2: 98%   Weight: 214 lb 12.8 oz (97.4 kg)   Height: 5' 9\" (1.753 m)   PainSc:   0 - No pain         Learning Assessment:  :     Learning Assessment 10/24/2019   PRIMARY LEARNER Patient   PRIMARY LANGUAGE ENGLISH   LEARNER PREFERENCE PRIMARY LISTENING   ANSWERED BY patient s   RELATIONSHIP SELF       Depression Screening:  :     3 most recent PHQ Screens 10/24/2019   Little interest or pleasure in doing things Not at all   Feeling down, depressed, irritable, or hopeless Not at all   Total Score PHQ 2 0       Fall Risk Assessment:  :     Fall Risk Assessment, last 12 mths 10/24/2019   Able to walk? Yes   Fall in past 12 months? No       Abuse Screening:  :     Abuse Screening Questionnaire 10/24/2019   Do you ever feel afraid of your partner? N   Are you in a relationship with someone who physically or mentally threatens you? N   Is it safe for you to go home? Y       ADL Screening:  :     ADL Assessment 10/24/2019   Feeding yourself No Help Needed   Getting from bed to chair No Help Needed   Getting dressed No Help Needed   Bathing or showering No Help Needed   Walk across the room (includes cane/walker) No Help Needed   Using the telphone No Help Needed   Taking your medications No Help Needed   Preparing meals No Help Needed   Managing money (expenses/bills) No Help Needed   Moderately strenuous housework (laundry) No Help Needed   Shopping for personal items (toiletries/medicines) No Help Needed   Shopping for groceries No Help Needed   Driving No Help Needed   Climbing a flight of stairs No Help Needed   Getting to places beyond walking distances No Help Needed         Medication reconciliation up to date and corrected with patient at this time.

## 2019-12-13 NOTE — PATIENT INSTRUCTIONS
Diabetes and Alcohol: Care Instructions Your Care Instructions People who have diabetes need to be more careful with alcohol. Before you drink, consider a few things: Is your diabetes well controlled? Do you know how drinking alcohol can affect you? Do you have high blood pressure, nerve damage, or eye problems from your diabetes? If you take insulin or another medicine for diabetes, drinking alcohol may cause low blood sugar. This could cause dangerous low blood sugar levels. Too much alcohol can also affect your ability to know your blood sugar is low and to treat it. Drinking alcohol can make you lightheaded at first and drowsy as you drink more, both of which may be similar to the symptoms of low blood sugar. Drinking a lot of alcohol over a long period of time can damage your liver (cirrhosis). If this happens, your body may lose its natural response to protect itself from low blood sugar. If you are controlling your diabetes and do not have other health issues, it may be okay to have a drink once in a while. Learning how alcohol affects your body can help you make the right choices. Follow-up care is a key part of your treatment and safety. Be sure to make and go to all appointments, and call your doctor if you are having problems. It's also a good idea to know your test results and keep a list of the medicines you take. How can you care for yourself at home? If you drink · Work with your doctor or other diabetes expert to find what is best for you. Make sure you know whether it is safe to drink if you are taking insulin or another medicine for diabetes. · In general, limit alcohol to 1 drink a day with a meal if you are a woman. If you are a man, limit alcohol to 2 drinks a day with a meal. The following is considered a standard drink: 
? One 12-ounce bottle of beer or wine cooler ? One 5-ounce glass of wine ?  One mixed drink with 1.5 ounces of 80-proof hard liquor, such as gin, whiskey, or rum · Choose alcoholic drinks wisely. With hard alcohol, use sugar-free mixers, such as diet tonic, water, or club soda. Pick drinks that have less alcohol, including light beer or dry wine. Or add club soda to wine to dilute it. Also remember that most alcoholic drinks have a lot of calories. · When you drink, check your blood sugar before you go to bed. Have a snack before bed so your blood sugar does not drop while you sleep. When not to drink · Never drink on an empty stomach. If you do drink alcohol, drink it only with a meal or snack. Having as little as 2 drinks on an empty stomach could lead to low blood sugar. · Do not drink alcohol if you have problems recognizing the signs of low blood sugar until they become severe. · Do not drink alcohol after you exercise. The exercise itself lowers blood sugar. · Do not drink if you have nerve damage. Drinking can make it worse and increase the pain, numbness, and other symptoms. · Do not drink if you have high blood pressure. · Do not drink if you have diabetic eye disease. · Do not drink if you have high triglycerides, a type of fat in your blood. Drinking can raise triglycerides. · Do not drink if you are trying to lose weight. Alcohol provides empty calories that do not give you any nutrients. · Do not drink and drive. The effects of alcohol are greater if you have low blood sugar. When should you call for help? Call 911 anytime you think you may need emergency care. For example, call if: 
  · You passed out (lost consciousness).  
  · You are confused or cannot think clearly.  
  · Your blood sugar is very high or very low.  
 Watch closely for changes in your health, and be sure to contact your doctor if: 
  · Your blood sugar stays outside the level your doctor set for you.  
  · You have any problems. Where can you learn more? Go to http://norbert-luciano.info/. Enter T236 in the search box to learn more about \"Diabetes and Alcohol: Care Instructions. \" Current as of: April 16, 2019 Content Version: 12.2 © 8791-5380 [employer]. Care instructions adapted under license by [employer] (which disclaims liability or warranty for this information). If you have questions about a medical condition or this instruction, always ask your healthcare professional. [address] any warranty or liability for your use of this information. High Cholesterol: Care Instructions Your Care Instructions Cholesterol is a type of fat in your blood. It is needed for many body functions, such as making new cells. Cholesterol is made by your body. It also comes from food you eat. High cholesterol means that you have too much of the fat in your blood. This raises your risk of a heart attack and stroke. LDL and HDL are part of your total cholesterol. LDL is the \"bad\" cholesterol. High LDL can raise your risk for heart disease, heart attack, and stroke. HDL is the \"good\" cholesterol. It helps clear bad cholesterol from the body. High HDL is linked with a lower risk of heart disease, heart attack, and stroke. Your cholesterol levels help your doctor find out your risk for having a heart attack or stroke. You and your doctor can talk about whether you need to lower your risk and what treatment is best for you. A heart-healthy lifestyle along with medicines can help lower your cholesterol and your risk. The way you choose to lower your risk will depend on how high your risk is for heart attack and stroke. It will also depend on how you feel about taking medicines. Follow-up care is a key part of your treatment and safety. Be sure to make and go to all appointments, and call your doctor if you are having problems. It's also a good idea to know your test results and keep a list of the medicines you take. How can you care for yourself at home? · Eat a variety of foods every day. Good choices include fruits, vegetables, whole grains (like oatmeal), dried beans and peas, nuts and seeds, soy products (like tofu), and fat-free or low-fat dairy products. · Replace butter, margarine, and hydrogenated or partially hydrogenated oils with olive and canola oils. (Canola oil margarine without trans fat is fine.) · Replace red meat with fish, poultry, and soy protein (like tofu). · Limit processed and packaged foods like chips, crackers, and cookies. · Bake, broil, or steam foods. Don't contreras them. · Be physically active. Get at least 30 minutes of exercise on most days of the week. Walking is a good choice. You also may want to do other activities, such as running, swimming, cycling, or playing tennis or team sports. · Stay at a healthy weight or lose weight by making the changes in eating and physical activity listed above. Losing just a small amount of weight, even 5 to 10 pounds, can reduce your risk for having a heart attack or stroke. · Do not smoke. When should you call for help? Watch closely for changes in your health, and be sure to contact your doctor if: 
  · You need help making lifestyle changes.  
  · You have questions about your medicine. Where can you learn more? Go to http://norbert-luciano.info/. Enter Y896 in the search box to learn more about \"High Cholesterol: Care Instructions. \" Current as of: April 9, 2019 Content Version: 12.2 © 8823-7191 [employer]. Care instructions adapted under license by [employer] (which disclaims liability or warranty for this information). If you have questions about a medical condition or this instruction, always ask your healthcare professional. [address] any warranty or liability for your use of this information.

## 2019-12-13 NOTE — PROGRESS NOTES
CC:   Chief Complaint   Patient presents with    Diabetes     Room 3B// eye exam this am- brought copy        HISTORY OF PRESENT ILLNESS  Jena Flores is a 53 y.o. male. Presents for 1 month follow up evaluation of DM. Was recently diagnosed with type 2 DM recently with A1c 9.6% on 10/24/19 and hyperlipidemia with tot chol 240,  on 10/24/19. Repeat A1c 9.3% on 11/13/19. No known family history of DM but knows nothing about his father's side of the family. Polyuria improved. Denies polydipsia, fatigue, and hypoglycemia. Home glucose monitoring: is not performed. He reports medication compliance: compliant most of the time. Medication side effects: diarrhea and GI upset when he eats sweet foods. Diabetic diet compliance: compliant most of the time. Has been eating more fruits and vegetables and less starchy foods and sweets. exercising by riding his bike from 1-3 miles a day. Eye exam: done today, patient brought in copy of eye exam report. Soc Hx  Single.  Lives with girlfriend who is a nurse.  Has no children.  Works as a  for [employer].  Never smoker.  Used to drink 4 beers a day 3 times a week; has decreased to 2 beers 3 times a week, no alcohol over past 2 weeks.  Denies recreational drug use.  Exercises by biking.  Drinks 3-4 cups of coffee a day.     ROS  A complete review of systems was performed and is negative except for those mentioned in the HPI. Patient Active Problem List   Diagnosis Code    Elevated blood pressure reading without diagnosis of hypertension R03.0    Obesity (BMI 30-39. 9) E66.9    Seasonal allergic rhinitis due to pollen J30.1    Abdominal wall hernia K43.9     No past medical history on file. Allergies   Allergen Reactions    Latex Rash       Current Outpatient Medications   Medication Sig Dispense Refill    metFORMIN ER (GLUCOPHAGE XR) 500 mg tablet Take 1 Tab by mouth daily (with dinner).  30 Tab 1    fluticasone propionate (FLONASE ALLERGY RELIEF) 50 mcg/actuation nasal spray by Nasal route. PHYSICAL EXAM  Visit Vitals  /65 (BP 1 Location: Left arm, BP Patient Position: Sitting)   Pulse 60   Temp 98 °F (36.7 °C) (Oral)   Resp 14   Ht 5' 9\" (1.753 m)   Wt 214 lb 12.8 oz (97.4 kg)   SpO2 98%   BMI 31.72 kg/m²   8 lb weight loss since last clinic visit 1 month ago    General: Obese, no distress. HEENT:  Head normocephalic/atraumatic, no scleral icterus  Lungs:  Clear to ausculation bilaterally. Good air movement. Heart:  Regular rate and rhythm, normal S1 and S2, no murmur, gallop, or rub  Extremities: No clubbing, cyanosis, or edema. Neurological: Alert and oriented. Psychiatric: Normal mood and affect. Behavior is normal.     Results for orders placed or performed in visit on 12/13/19   AMB POC GLUCOSE BLOOD, BY GLUCOSE MONITORING DEVICE   Result Value Ref Range    Glucose  mg/dL         ASSESSMENT AND PLAN    ICD-10-CM ICD-9-CM    1. Type 2 diabetes mellitus without complication, without long-term current use of insulin (HCC) E11.9 250.00    2. Mixed hyperlipidemia E78.2 272.2    3. Obesity (BMI 30-39. 9) E66.9 278.00 AMB POC GLUCOSE BLOOD, BY GLUCOSE MONITORING DEVICE   4. Screening for colon cancer Z12.11 V76.51 OCCULT BLOOD IMMUNOASSAY,DIAGNOSTIC     Diagnoses and all orders for this visit:    1. Type 2 diabetes mellitus without complication, without long-term current use of insulin (HCC)  Last A1c 9.3% on 11/13/19. Normal random BS today. Continue metformin  mg daily and working on diet, exercise, and weight loss. -     AMB POC GLUCOSE BLOOD, BY GLUCOSE MONITORING DEVICE    2. Mixed hyperlipidemia  Strongly recommended he start on cholesterol-lowering medication but he declined. He is concerned that statins cause adverse side effects. Wants to continue working on lifestyle modification. 3. Obesity (BMI 30-39. 9)  Counseled on diet, exercise, and weight loss.     4. Screening for colon cancer  -     OCCULT BLOOD IMMUNOASSAY,DIAGNOSTIC; Future      Follow-up and Dispositions    · Return in about 2 months (around 2/13/2020), or if symptoms worsen or fail to improve, for DM, chol, fasting labs on same day. I have discussed the diagnosis with the patient and the intended plan as seen in the above orders. Patient is in agreement. The patient has received an after-visit summary and questions were answered concerning future plans. I have discussed medication side effects and warnings with the patient as well.

## 2020-02-17 ENCOUNTER — OFFICE VISIT (OUTPATIENT)
Dept: INTERNAL MEDICINE CLINIC | Facility: CLINIC | Age: 55
End: 2020-02-17

## 2020-02-17 VITALS
WEIGHT: 210 LBS | HEART RATE: 71 BPM | HEIGHT: 69 IN | SYSTOLIC BLOOD PRESSURE: 130 MMHG | RESPIRATION RATE: 12 BRPM | TEMPERATURE: 98 F | DIASTOLIC BLOOD PRESSURE: 81 MMHG | BODY MASS INDEX: 31.1 KG/M2 | OXYGEN SATURATION: 96 %

## 2020-02-17 DIAGNOSIS — E78.2 MIXED HYPERLIPIDEMIA: ICD-10-CM

## 2020-02-17 DIAGNOSIS — E66.9 OBESITY (BMI 30-39.9): ICD-10-CM

## 2020-02-17 DIAGNOSIS — E11.9 TYPE 2 DIABETES MELLITUS WITHOUT COMPLICATION, WITHOUT LONG-TERM CURRENT USE OF INSULIN (HCC): Primary | ICD-10-CM

## 2020-02-17 DIAGNOSIS — Z23 ENCOUNTER FOR IMMUNIZATION: ICD-10-CM

## 2020-02-17 LAB — HBA1C MFR BLD HPLC: 6.5 %

## 2020-02-17 NOTE — PATIENT INSTRUCTIONS
Vaccine Information Statement    Pneumococcal Polysaccharide Vaccine (PPSV23): What You Need to Know    Many Vaccine Information Statements are available in Italian and other languages. See www.immunize.org/vis  Hojas de información sobre vacunas están disponibles en español y en muchos otros idiomas. Visite www.immunize.org/vis    1. Why get vaccinated? Pneumococcal polysaccharide vaccine (PPSV23) can prevent pneumococcal disease. Pneumococcal disease refers to any illness caused by pneumococcal bacteria. These bacteria can cause many types of illnesses, including pneumonia, which is an infection of the lungs. Pneumococcal bacteria are one of the most common causes of pneumonia. Besides pneumonia, pneumococcal bacteria can also cause:   Ear infections   Sinus infections   Meningitis (infection of the tissue covering the brain and spinal cord)   Bacteremia (bloodstream infection)    Anyone can get pneumococcal disease, but children under 3years of age, people with certain medical conditions, adults 72 years or older, and cigarette smokers are at the highest risk. Most pneumococcal infections are mild. However, some can result in long-term problems, such as brain damage or hearing loss. Meningitis, bacteremia, and pneumonia caused by pneumococcal disease can be fatal.     2. PPSV23     PPSV23 protects against 23 types of bacteria that cause pneumococcal disease. PPSV23 is recommended for:   All adults 72 years or older,   Anyone 2 years or older with certain medical conditions that can lead to an increased risk for pneumococcal disease. Most people need only one dose of PPSV23. A second dose of PPSV23, and another type of pneumococcal vaccine called PCV13, are recommended for certain high-risk groups. Your health care provider can give you more information.     People 65 years or older should get a dose of PPSV23 even if they have already gotten one or more doses of the vaccine before they turned 72.    3. Talk with your health care provider    Tell your vaccine provider if the person getting the vaccine:   Has had an allergic reaction after a previous dose of PPSV23, or has any severe, life-threatening allergies. In some cases, your health care provider may decide to postpone PPSV23 vaccination to a future visit. People with minor illnesses, such as a cold, may be vaccinated. People who are moderately or severely ill should usually wait until they recover before getting PPSV23. Your health care provider can give you more information. 4. Risks of a vaccine reaction     Redness or pain where the shot is given, feeling tired, fever, or muscle aches can happen after PPSV23. People sometimes faint after medical procedures, including vaccination. Tell your provider if you feel dizzy or have vision changes or ringing in the ears. As with any medicine, there is a very remote chance of a vaccine causing a severe allergic reaction, other serious injury, or death. 5. What if there is a serious problem? An allergic reaction could occur after the vaccinated person leaves the clinic. If you see signs of a severe allergic reaction (hives, swelling of the face and throat, difficulty breathing, a fast heartbeat, dizziness, or weakness), call 9-1-1 and get the person to the nearest hospital.    For other signs that concern you, call your health care provider. Adverse reactions should be reported to the Vaccine Adverse Event Reporting System (VAERS). Your health care provider will usually file this report, or you can do it yourself. Visit the VAERS website at www.vaers. hhs.gov or call 1-606.444.3304. VAERS is only for reporting reactions, and VAERS staff do not give medical advice. 6. How can I learn more?  Ask your health care provider.  Call your local or state health department.    Contact the Centers for Disease Control and Prevention (CDC):  - Call 4-522.599.2799 (8-157-DNS-INFO) or  - Visit CDCs website at www.cdc.gov/vaccines    Vaccine Information Statement   PPSV23   10/30/2019    [facility] for Disease Control and Prevention    Office Use Only

## 2020-02-17 NOTE — PROGRESS NOTES
CC:   Chief Complaint   Patient presents with    Diabetes    Cholesterol Problem    Immunization/Injection       HISTORY OF PRESENT ILLNESS  Joelle Zambrano is a 53 y.o. male. Presents for 3 month follow up evaluation. He has type 2 DM, hyperlipidemia, and elevated BP without history of HTN. No complaints today. Endocrine Review  He is seen for diabetes. Since last visit he reports no polyuria or polydipsia, no hypoglycemia. Home glucose monitoring: is not performed. He reports medication compliance: compliant all of the time. Medication side effects: none. Diabetic diet compliance: compliant most of the time. Lab review: A1c is 6.5% today (2/17/20), was 9.6% on 10/24/19. Eye exam: UTD. Soc Hx  Single.  Lives with girlfriend who is a nurse.  Has no children.  Works as a  for [employer].  Never smoker.  Used to drink 4 beers a day 3 times a week; has decreased to 2 beers 3 times a week, no alcohol over past 2 weeks.  Denies recreational drug use.  Exercises by biking.  Drinks 3-4 cups of coffee a day.     ROS  A complete review of systems was performed and is negative except for those mentioned in the HPI. Patient Active Problem List   Diagnosis Code    Elevated blood pressure reading without diagnosis of hypertension R03.0    Obesity (BMI 30-39. 9) E66.9    Seasonal allergic rhinitis due to pollen J30.1    Abdominal wall hernia K43.9     Past Medical History:   Diagnosis Date    Obesity     Seasonal allergic rhinitis      Allergies   Allergen Reactions    Latex Rash       Current Outpatient Medications   Medication Sig Dispense Refill    metFORMIN ER (GLUCOPHAGE XR) 500 mg tablet Take 1 Tab by mouth daily (with dinner). 30 Tab 1    fluticasone propionate (FLONASE ALLERGY RELIEF) 50 mcg/actuation nasal spray by Nasal route.            PHYSICAL EXAM  Visit Vitals  /81 (BP 1 Location: Left arm, BP Patient Position: Sitting)   Pulse 71   Temp 98 °F (36.7 °C) (Oral)   Resp 12   Ht 5' 9\" (1.753 m)   Wt 210 lb (95.3 kg)   SpO2 96%   BMI 31.01 kg/m²       General: Obese, no distress. HEENT:  Head normocephalic/atraumatic, no scleral icterus  Lungs:  Clear to ausculation bilaterally. Good air movement. Heart:  Regular rate and rhythm, normal S1 and S2, no murmur, gallop, or rub  Extremities: No clubbing, cyanosis, or edema. Neurological: Alert and oriented. Psychiatric: Normal mood and affect. Behavior is normal.       Results for orders placed or performed in visit on 02/17/20   AMB POC HEMOGLOBIN A1C   Result Value Ref Range    Hemoglobin A1c (POC) 6.5 %         ASSESSMENT AND PLAN    ICD-10-CM ICD-9-CM    1. Type 2 diabetes mellitus without complication, without long-term current use of insulin (HCC) E11.9 250.00 AMB POC HEMOGLOBIN A1C   2. Mixed hyperlipidemia E78.2 272.2 LIPID PANEL   3. Obesity (BMI 30-39. 9) E66.9 278.00    4. Encounter for immunization Z23 V03.89 PNEUMOCOCCAL POLYSACCHARIDE VACCINE, 23-VALENT, ADULT OR IMMUNOSUPPRESSED PT DOSE,      GA IMMUNIZ ADMIN,1 SINGLE/COMB VAC/TOXOID     Diagnoses and all orders for this visit:    1. Type 2 diabetes mellitus without complication, without long-term current use of insulin (HCC)  Significantly improved with A1c now 6.5%, down from 9.6%. Continue metformin  mg daily, diabetic diet, and regular exercise. -     AMB POC HEMOGLOBIN A1C    2. Mixed hyperlipidemia  He declines starting a statin. Given lab order for FLP to take to [facility] -     LIPID PANEL    3. Obesity (BMI 30-39. 9)  Counseled on diet, exercise, and weight loss. 4. Encounter for immunization  -     PNEUMOCOCCAL POLYSACCHARIDE VACCINE, 23-VALENT, ADULT OR IMMUNOSUPPRESSED PT DOSE,  -     GA IMMUNIZ ADMIN,1 SINGLE/COMB VAC/TOXOID      Follow-up and Dispositions    · Return in about 3 months (around 5/17/2020), or if symptoms worsen or fail to improve, for DM, HTN.            I have discussed the diagnosis with the patient and the intended plan as seen in the above orders. Patient is in agreement. The patient has received an after-visit summary and questions were answered concerning future plans. I have discussed medication side effects and warnings with the patient as well.

## 2020-02-17 NOTE — PROGRESS NOTES
Dionicio Leblanc  Identified pt with two pt identifiers(name and ). Chief Complaint   Patient presents with    Diabetes    Cholesterol Problem       Reviewed record In preparation for visit and have obtained necessary documentation. Has info on advanced directive but has not filled them out. 1. Have you been to the ER, urgent care clinic or hospitalized since your last visit? No     2. Have you seen or consulted any other health care providers outside of the [address] [place] [address] since your last visit? Include any pap smears or colon screening. No    Vitals reviewed with provider. Health Maintenance reviewed: After verbal order read back of , patient received Pneumococcal 23 in left arm. [address] [id number] lot  [id number] exp 2020 Patient tolerated procedure without complaints and received VIS. Health Maintenance Due   Topic    Pneumococcal 0-64 years (1 of 1 - PPSV23)    FOBT Q1Y Age 54-65     A1C test (Diabetic or Prediabetic)         Wt Readings from Last 3 Encounters:   19 214 lb 12.8 oz (97.4 kg)   19 222 lb 9.6 oz (101 kg)   10/24/19 223 lb (101.2 kg)      Temp Readings from Last 3 Encounters:   19 98 °F (36.7 °C) (Oral)   19 98.3 °F (36.8 °C) (Oral)   10/24/19 98 °F (36.7 °C) (Oral)      BP Readings from Last 3 Encounters:   19 104/65   19 134/77   10/24/19 122/70      Pulse Readings from Last 3 Encounters:   19 60   19 85   10/24/19 79      There were no vitals filed for this visit.        Learning Assessment:   :     Learning Assessment 10/24/2019   PRIMARY LEARNER Patient   PRIMARY LANGUAGE ENGLISH   LEARNER PREFERENCE PRIMARY LISTENING   ANSWERED BY patient s   RELATIONSHIP SELF        Depression Screening:   :     3 most recent PHQ Screens 10/24/2019   Little interest or pleasure in doing things Not at all   Feeling down, depressed, irritable, or hopeless Not at all   Total Score PHQ 2 0        Fall Risk Assessment:   : Fall Risk Assessment, last 12 mths 10/24/2019   Able to walk? Yes   Fall in past 12 months? No        Abuse Screening:   :     Abuse Screening Questionnaire 10/24/2019   Do you ever feel afraid of your partner? N   Are you in a relationship with someone who physically or mentally threatens you? N   Is it safe for you to go home?  Y        ADL Screening:   :     ADL Assessment 10/24/2019   Feeding yourself No Help Needed   Getting from bed to chair No Help Needed   Getting dressed No Help Needed   Bathing or showering No Help Needed   Walk across the room (includes cane/walker) No Help Needed   Using the telphone No Help Needed   Taking your medications No Help Needed   Preparing meals No Help Needed   Managing money (expenses/bills) No Help Needed   Moderately strenuous housework (laundry) No Help Needed   Shopping for personal items (toiletries/medicines) No Help Needed   Shopping for groceries No Help Needed   Driving No Help Needed   Climbing a flight of stairs No Help Needed   Getting to places beyond walking distances No Help Needed Improved

## 2020-02-19 LAB — HEMOCCULT STL QL IA: POSITIVE

## 2020-02-25 DIAGNOSIS — Z12.11 SCREENING FOR COLON CANCER: Primary | ICD-10-CM

## 2020-02-25 NOTE — PROGRESS NOTES
Message sent to patient by My Chart:  Your FIT test for colon cancer screening returned showing microscopic blood in the stool. This means you should have a colonoscopy for more evaluation.   Please call the following to schedule an appointment for a colonoscopy with Dr. Jose M Gilman or one of his partners:     [facility]  [phone], [place], [address]  Phone: [phone]    Dr. Ruth Marin

## 2020-07-02 LAB
CHOLEST SERPL-MCNC: 242 MG/DL (ref 100–199)
HDLC SERPL-MCNC: 45 MG/DL
LDLC SERPL CALC-MCNC: 174 MG/DL (ref 0–99)
TRIGL SERPL-MCNC: 117 MG/DL (ref 0–149)
VLDLC SERPL CALC-MCNC: 23 MG/DL (ref 5–40)

## 2020-07-06 NOTE — PROGRESS NOTES
Will discuss lab results at 7/7/20 clinic visit. Tot chol 242,  (was 240 and 167 on 10/24/19). Has type 2 DM. Plan to start on a statin.

## 2020-07-07 ENCOUNTER — OFFICE VISIT (OUTPATIENT)
Dept: INTERNAL MEDICINE CLINIC | Facility: CLINIC | Age: 55
End: 2020-07-07

## 2020-07-07 VITALS
RESPIRATION RATE: 16 BRPM | HEIGHT: 69 IN | WEIGHT: 208.4 LBS | BODY MASS INDEX: 30.87 KG/M2 | OXYGEN SATURATION: 98 % | HEART RATE: 70 BPM | TEMPERATURE: 98.3 F | DIASTOLIC BLOOD PRESSURE: 87 MMHG | SYSTOLIC BLOOD PRESSURE: 146 MMHG

## 2020-07-07 DIAGNOSIS — E11.9 TYPE 2 DIABETES MELLITUS WITHOUT COMPLICATION, WITHOUT LONG-TERM CURRENT USE OF INSULIN (HCC): Primary | ICD-10-CM

## 2020-07-07 DIAGNOSIS — E78.2 MIXED HYPERLIPIDEMIA: ICD-10-CM

## 2020-07-07 DIAGNOSIS — R03.0 ELEVATED BLOOD PRESSURE READING WITHOUT DIAGNOSIS OF HYPERTENSION: ICD-10-CM

## 2020-07-07 DIAGNOSIS — Z12.5 SCREENING FOR PROSTATE CANCER: ICD-10-CM

## 2020-07-07 LAB — HBA1C MFR BLD HPLC: 6.2 %

## 2020-07-07 RX ORDER — PRAVASTATIN SODIUM 20 MG/1
20 TABLET ORAL
Qty: 90 TAB | Refills: 1 | Status: SHIPPED | OUTPATIENT
Start: 2020-07-07 | End: 2021-01-29 | Stop reason: SDUPTHER

## 2020-07-07 RX ORDER — METFORMIN HYDROCHLORIDE 500 MG/1
500 TABLET, EXTENDED RELEASE ORAL
Qty: 90 TAB | Refills: 3 | Status: SHIPPED | OUTPATIENT
Start: 2020-07-07 | End: 2021-03-03 | Stop reason: SDUPTHER

## 2020-07-07 NOTE — PATIENT INSTRUCTIONS

## 2020-07-07 NOTE — PROGRESS NOTES
Jhonatan Gross  Identified pt with two pt identifiers(name and ). Chief Complaint   Patient presents with    Diabetes    Hypertension       Reviewed record In preparation for visit and have obtained necessary documentation. 1. Have you been to the ER, urgent care clinic or hospitalized since your last visit? No     2. Have you seen or consulted any other health care providers outside of the [facility] since your last visit? Include any pap smears or colon screening. No    Patient does not have an advance directive. Vitals reviewed with provider. Health Maintenance reviewed: There are no preventive care reminders to display for this patient. Wt Readings from Last 3 Encounters:   20 208 lb 6.4 oz (94.5 kg)   20 210 lb (95.3 kg)   19 214 lb 12.8 oz (97.4 kg)        Temp Readings from Last 3 Encounters:   20 98.3 °F (36.8 °C) (Oral)   20 98 °F (36.7 °C) (Oral)   19 98 °F (36.7 °C) (Oral)        BP Readings from Last 3 Encounters:   20 152/87   20 130/81   19 104/65        Pulse Readings from Last 3 Encounters:   20 70   20 71   19 60        Vitals:    20 0758   BP: 152/87   Pulse: 70   Resp: 16   Temp: 98.3 °F (36.8 °C)   TempSrc: Oral   SpO2: 98%   Weight: 208 lb 6.4 oz (94.5 kg)   Height: 5' 9\" (1.753 m)   PainSc:   0 - No pain          Learning Assessment:   :       Learning Assessment 10/24/2019   PRIMARY LEARNER Patient   PRIMARY LANGUAGE ENGLISH   LEARNER PREFERENCE PRIMARY LISTENING   ANSWERED BY patient s   RELATIONSHIP SELF        Depression Screening:   :       3 most recent PHQ Screens 2020   Little interest or pleasure in doing things Not at all   Feeling down, depressed, irritable, or hopeless Not at all   Total Score PHQ 2 0        Fall Risk Assessment:   :       Fall Risk Assessment, last 12 mths 10/24/2019   Able to walk? Yes   Fall in past 12 months?  No        Abuse Screening: :       Abuse Screening Questionnaire 10/24/2019   Do you ever feel afraid of your partner? N   Are you in a relationship with someone who physically or mentally threatens you? N   Is it safe for you to go home?  Y        ADL Screening:   :       ADL Assessment 10/24/2019   Feeding yourself No Help Needed   Getting from bed to chair No Help Needed   Getting dressed No Help Needed   Bathing or showering No Help Needed   Walk across the room (includes cane/walker) No Help Needed   Using the telphone No Help Needed   Taking your medications No Help Needed   Preparing meals No Help Needed   Managing money (expenses/bills) No Help Needed   Moderately strenuous housework (laundry) No Help Needed   Shopping for personal items (toiletries/medicines) No Help Needed   Shopping for groceries No Help Needed   Driving No Help Needed   Climbing a flight of stairs No Help Needed   Getting to places beyond walking distances No Help Needed

## 2020-07-07 NOTE — PROGRESS NOTES
CC:   Chief Complaint   Patient presents with    Diabetes    Hypertension       HISTORY OF PRESENT ILLNESS  Belinda Lefort is a 63 y.o. male. Presents for 4 month follow up evaluation. He has type 2 DM, hyperlipidemia, and elevated BP without history of HTN. No complaints today. Reports he injured his right knee in 12/19, took about 3 months to heal, not exercising as much as he used to.     Endocrine Review  He is seen for diabetes diagnosed with A1c 9.6% on 10/24/19.  Since last visit he reports no polyuria or polydipsia, fatigue, or hypoglycemia.  Home glucose monitoring: is not performed.  He reports medication compliance: compliant all of the time.  Medication side effects: none.  Diabetic diet compliance: compliant most of the time.  Lab review: A1c is 6.2% today (7/7/20), was 6.5% on 2/17/20.  Eye exam: UTD.       Soc Hx  Single.  Lives with girlfriend who is a nurse.  Has no children.  Works as a  for [employer].  Never smoker.  Drinks 2 beers 3 times a week.  Denies recreational drug use.  Exercises by biking and walking (used to walk 6 miles a day).  Drinks 3-4 cups of coffee a day.     ROS  A complete review of systems was performed and is negative except for those mentioned in the HPI.   weakness, joint pain     Patient Active Problem List   Diagnosis Code    Elevated blood pressure reading without diagnosis of hypertension R03.0    Obesity (BMI 30-39. 9) E66.9    Seasonal allergic rhinitis due to pollen J30.1    Abdominal wall hernia K43.9    Type 2 diabetes mellitus without complication, without long-term current use of insulin ([facility]) E11.9    Mixed hyperlipidemia E78.2     Past Medical History:   Diagnosis Date    Obesity     Seasonal allergic rhinitis      Allergies   Allergen Reactions    Latex Rash       Current Outpatient Medications   Medication Sig Dispense Refill    metFORMIN ER (GLUCOPHAGE XR) 500 mg tablet Take 1 Tab by mouth daily (with dinner). Indications: type 2 diabetes mellitus 90 Tab 3    pravastatin (PRAVACHOL) 20 mg tablet Take 1 Tab by mouth nightly. Indications: high cholesterol 90 Tab 1    fluticasone propionate (FLONASE ALLERGY RELIEF) 50 mcg/actuation nasal spray by Nasal route. PHYSICAL EXAM  Visit Vitals  /87 (BP 1 Location: Right arm, BP Patient Position: Sitting)   Pulse 70   Temp 98.3 °F (36.8 °C) (Oral)   Resp 16   Ht 5' 9\" (1.753 m)   Wt 208 lb 6.4 oz (94.5 kg)   SpO2 98%   BMI 30.78 kg/m²   Repeat /82    General: Obese, no distress. HEENT:  Head normocephalic/atraumatic, no scleral icterus  Lungs:  Clear to ausculation bilaterally. Good air movement. Heart:  Regular rate and rhythm, normal S1 and S2, no murmur, gallop, or rub  Extremities: No clubbing, cyanosis, or edema. Right knee with no tenderness and normal ROM. Neurological: Alert and oriented. Psychiatric: Normal mood and affect. Behavior is normal.     Results for orders placed or performed in visit on 07/07/20   AMB POC HEMOGLOBIN A1C   Result Value Ref Range    Hemoglobin A1c (POC) 6.2 %     Results for orders placed or performed in visit on 07/07/20   AMB POC HEMOGLOBIN A1C   Result Value Ref Range    Hemoglobin A1c (POC) 6.2 %           ASSESSMENT AND PLAN    ICD-10-CM ICD-9-CM    1. Type 2 diabetes mellitus without complication, without long-term current use of insulin (HCC) E11.9 250.00 AMB POC HEMOGLOBIN A1C      metFORMIN ER (GLUCOPHAGE XR) 500 mg tablet      METABOLIC PANEL, COMPREHENSIVE      CBC WITH AUTOMATED DIFF      HEMOGLOBIN A1C WITH EAG      MICROALBUMIN, UR, RAND W/ MICROALB/CREAT RATIO   2. Mixed hyperlipidemia E78.2 272.2 pravastatin (PRAVACHOL) 20 mg tablet      TSH RFX ON ABNORMAL TO FREE T4      LIPID PANEL   3. Elevated blood pressure reading without diagnosis of hypertension R03.0 796.2    4. Screening for prostate cancer Z12.5 V76.44 PSA SCREENING (SCREENING)     Diagnoses and all orders for this visit:    1.  Type 2 diabetes mellitus without complication, without long-term current use of insulin ([facility])  A1c 6.2%. Controlled. Continue metformin 500 mg daily.  -     AMB POC HEMOGLOBIN A1C  -     Refill metFORMIN ER (GLUCOPHAGE XR) 500 mg tablet; Take 1 Tab by mouth daily (with dinner). Indications: type 2 diabetes mellitus  -     METABOLIC PANEL, COMPREHENSIVE; Future  -     CBC WITH AUTOMATED DIFF; Future  -     HEMOGLOBIN A1C WITH EAG; Future  -     MICROALBUMIN, UR, RAND W/ MICROALB/CREAT RATIO; Future    2. Mixed hyperlipidemia  -     Start pravastatin (PRAVACHOL) 20 mg tablet; Take 1 Tab by mouth nightly. Indications: high cholesterol  -     TSH RFX ON ABNORMAL TO FREE T4; Future  -     LIPID PANEL; Future    3. Elevated blood pressure reading without diagnosis of hypertension  Patient is hesitant to start on a 2nd new medication today. 4. Screening for prostate cancer  -     PSA SCREENING (SCREENING); Future      Follow-up and Dispositions    · Return in about 3 months (around 10/7/2020), or if symptoms worsen or fail to improve, for DM, HTN, cholesterol; schedule fasting labs 1 week prior to appointment. I have discussed the diagnosis with the patient and the intended plan as seen in the above orders. Patient is in agreement. The patient has received an after-visit summary and questions were answered concerning future plans. I have discussed medication side effects and warnings with the patient as well.

## 2020-12-22 PROBLEM — K22.70 BARRETT'S ESOPHAGUS WITHOUT DYSPLASIA: Status: ACTIVE | Noted: 2020-12-22

## 2020-12-22 LAB
ALBUMIN SERPL-MCNC: 4.4 G/DL (ref 3.8–4.9)
ALBUMIN/CREAT UR: <4 MG/G CREAT (ref 0–29)
ALBUMIN/GLOB SERPL: 1.5 {RATIO} (ref 1.2–2.2)
ALP SERPL-CCNC: 74 IU/L (ref 39–117)
ALT SERPL-CCNC: 17 IU/L (ref 0–44)
AST SERPL-CCNC: 20 IU/L (ref 0–40)
BASOPHILS # BLD AUTO: 0 X10E3/UL (ref 0–0.2)
BASOPHILS NFR BLD AUTO: 0 %
BILIRUB SERPL-MCNC: 0.4 MG/DL (ref 0–1.2)
BUN SERPL-MCNC: 26 MG/DL (ref 6–24)
BUN/CREAT SERPL: 24 (ref 9–20)
CALCIUM SERPL-MCNC: 9.8 MG/DL (ref 8.7–10.2)
CHLORIDE SERPL-SCNC: 103 MMOL/L (ref 96–106)
CHOLEST SERPL-MCNC: 200 MG/DL (ref 100–199)
CO2 SERPL-SCNC: 23 MMOL/L (ref 20–29)
CREAT SERPL-MCNC: 1.08 MG/DL (ref 0.76–1.27)
CREAT UR-MCNC: 71.1 MG/DL
EOSINOPHIL # BLD AUTO: 0.1 X10E3/UL (ref 0–0.4)
EOSINOPHIL NFR BLD AUTO: 1 %
ERYTHROCYTE [DISTWIDTH] IN BLOOD BY AUTOMATED COUNT: 12.4 % (ref 11.6–15.4)
EST. AVERAGE GLUCOSE BLD GHB EST-MCNC: 128 MG/DL
GLOBULIN SER CALC-MCNC: 2.9 G/DL (ref 1.5–4.5)
GLUCOSE SERPL-MCNC: 131 MG/DL (ref 65–99)
HBA1C MFR BLD: 6.1 % (ref 4.8–5.6)
HCT VFR BLD AUTO: 43 % (ref 37.5–51)
HDLC SERPL-MCNC: 50 MG/DL
HGB BLD-MCNC: 14.5 G/DL (ref 13–17.7)
IMM GRANULOCYTES # BLD AUTO: 0 X10E3/UL (ref 0–0.1)
IMM GRANULOCYTES NFR BLD AUTO: 0 %
LDLC SERPL CALC-MCNC: 133 MG/DL (ref 0–99)
LYMPHOCYTES # BLD AUTO: 1.5 X10E3/UL (ref 0.7–3.1)
LYMPHOCYTES NFR BLD AUTO: 29 %
MCH RBC QN AUTO: 30.1 PG (ref 26.6–33)
MCHC RBC AUTO-ENTMCNC: 33.7 G/DL (ref 31.5–35.7)
MCV RBC AUTO: 89 FL (ref 79–97)
MICROALBUMIN UR-MCNC: <3 UG/ML
MONOCYTES # BLD AUTO: 0.6 X10E3/UL (ref 0.1–0.9)
MONOCYTES NFR BLD AUTO: 11 %
NEUTROPHILS # BLD AUTO: 3.1 X10E3/UL (ref 1.4–7)
NEUTROPHILS NFR BLD AUTO: 59 %
PLATELET # BLD AUTO: 170 X10E3/UL (ref 150–450)
POTASSIUM SERPL-SCNC: 4.9 MMOL/L (ref 3.5–5.2)
PROT SERPL-MCNC: 7.3 G/DL (ref 6–8.5)
PSA SERPL-MCNC: 1.4 NG/ML (ref 0–4)
RBC # BLD AUTO: 4.81 X10E6/UL (ref 4.14–5.8)
SODIUM SERPL-SCNC: 144 MMOL/L (ref 134–144)
TRIGL SERPL-MCNC: 93 MG/DL (ref 0–149)
TSH SERPL DL<=0.005 MIU/L-ACNC: 1.68 UIU/ML (ref 0.45–4.5)
VLDLC SERPL CALC-MCNC: 17 MG/DL (ref 5–40)
WBC # BLD AUTO: 5.3 X10E3/UL (ref 3.4–10.8)

## 2020-12-28 NOTE — PROGRESS NOTES
Patient needs an appointment with me (virtual or in person) for 6 mon FU on DM, BP, and to discuss lab results. (Nl kidney and liver tests, blood counts, urine microalb, thyroid, and PSA. A1c 6.1%, well-controlled DM; was 6.2% in 7/20. Tot chol 200,  (improving but not at goal); 7/20: tot chol 242, . Plan to increase pravastatin from 20 to 40 mg nightly. )

## 2021-01-29 DIAGNOSIS — E78.2 MIXED HYPERLIPIDEMIA: ICD-10-CM

## 2021-01-29 RX ORDER — PRAVASTATIN SODIUM 20 MG/1
20 TABLET ORAL
Qty: 90 TAB | Refills: 0 | Status: SHIPPED | OUTPATIENT
Start: 2021-01-29 | End: 2021-03-03 | Stop reason: SDUPTHER

## 2021-01-29 NOTE — TELEPHONE ENCOUNTER
PCP: Raven Pineda MD     Last appt: Visit date not found   No future appointments. Requested Prescriptions     Pending Prescriptions Disp Refills    pravastatin (PRAVACHOL) 20 mg tablet 90 Tab 0     Sig: Take 1 Tab by mouth nightly.  Please call office to schedule an appointment [phone]  Indications: high cholesterol

## 2021-04-20 ENCOUNTER — OFFICE VISIT (OUTPATIENT)
Dept: INTERNAL MEDICINE CLINIC | Age: 56
End: 2021-04-20
Payer: COMMERCIAL

## 2021-04-20 VITALS
DIASTOLIC BLOOD PRESSURE: 76 MMHG | HEIGHT: 69 IN | TEMPERATURE: 98.3 F | WEIGHT: 216 LBS | RESPIRATION RATE: 18 BRPM | OXYGEN SATURATION: 98 % | SYSTOLIC BLOOD PRESSURE: 163 MMHG | BODY MASS INDEX: 31.99 KG/M2 | HEART RATE: 75 BPM

## 2021-04-20 DIAGNOSIS — E11.9 TYPE 2 DIABETES MELLITUS WITHOUT COMPLICATION, WITHOUT LONG-TERM CURRENT USE OF INSULIN (HCC): Primary | ICD-10-CM

## 2021-04-20 DIAGNOSIS — I10 ESSENTIAL HYPERTENSION: ICD-10-CM

## 2021-04-20 DIAGNOSIS — K22.70 BARRETT'S ESOPHAGUS WITHOUT DYSPLASIA: ICD-10-CM

## 2021-04-20 DIAGNOSIS — E66.9 OBESITY (BMI 30-39.9): ICD-10-CM

## 2021-04-20 DIAGNOSIS — E78.2 MIXED HYPERLIPIDEMIA: ICD-10-CM

## 2021-04-20 DIAGNOSIS — J30.1 SEASONAL ALLERGIC RHINITIS DUE TO POLLEN: ICD-10-CM

## 2021-04-20 LAB — HBA1C MFR BLD HPLC: 6.2 %

## 2021-04-20 PROCEDURE — 83036 HEMOGLOBIN GLYCOSYLATED A1C: CPT | Performed by: INTERNAL MEDICINE

## 2021-04-20 PROCEDURE — 99214 OFFICE O/P EST MOD 30 MIN: CPT | Performed by: INTERNAL MEDICINE

## 2021-04-20 RX ORDER — METFORMIN HYDROCHLORIDE 500 MG/1
500 TABLET, EXTENDED RELEASE ORAL
Qty: 30 TAB | Refills: 11 | Status: SHIPPED | OUTPATIENT
Start: 2021-04-20 | End: 2022-04-25 | Stop reason: SDUPTHER

## 2021-04-20 RX ORDER — PRAVASTATIN SODIUM 20 MG/1
20 TABLET ORAL
Qty: 30 TAB | Refills: 11 | Status: SHIPPED | OUTPATIENT
Start: 2021-04-20 | End: 2021-12-17

## 2021-04-20 RX ORDER — PANTOPRAZOLE SODIUM 40 MG/1
TABLET, DELAYED RELEASE ORAL
COMMUNITY
Start: 2021-04-09

## 2021-04-20 RX ORDER — LISINOPRIL 10 MG/1
10 TABLET ORAL DAILY
Qty: 30 TAB | Refills: 2 | Status: SHIPPED | OUTPATIENT
Start: 2021-04-20 | End: 2021-06-21

## 2021-04-20 NOTE — PATIENT INSTRUCTIONS
High Blood Pressure: Care Instructions Overview It's normal for blood pressure to go up and down throughout the day. But if it stays up, you have high blood pressure. Another name for high blood pressure is hypertension. Despite what a lot of people think, high blood pressure usually doesn't cause headaches or make you feel dizzy or lightheaded. It usually has no symptoms. But it does increase your risk of stroke, heart attack, and other problems. You and your doctor will talk about your risks of these problems based on your blood pressure. Your doctor will give you a goal for your blood pressure. Your goal will be based on your health and your age. Lifestyle changes, such as eating healthy and being active, are always important to help lower blood pressure. You might also take medicine to reach your blood pressure goal. 
Follow-up care is a key part of your treatment and safety. Be sure to make and go to all appointments, and call your doctor if you are having problems. It's also a good idea to know your test results and keep a list of the medicines you take. How can you care for yourself at home? Medical treatment · If you stop taking your medicine, your blood pressure will go back up. You may take one or more types of medicine to lower your blood pressure. Be safe with medicines. Take your medicine exactly as prescribed. Call your doctor if you think you are having a problem with your medicine. · Talk to your doctor before you start taking aspirin every day. Aspirin can help certain people lower their risk of a heart attack or stroke. But taking aspirin isn't right for everyone, because it can cause serious bleeding. · See your doctor regularly. You may need to see the doctor more often at first or until your blood pressure comes down. · If you are taking blood pressure medicine, talk to your doctor before you take decongestants or anti-inflammatory medicine, such as ibuprofen.  Some of these medicines can raise blood pressure. · Learn how to check your blood pressure at home. Lifestyle changes · Stay at a healthy weight. This is especially important if you put on weight around the waist. Losing even 10 pounds can help you lower your blood pressure. · If your doctor recommends it, get more exercise. Walking is a good choice. Bit by bit, increase the amount you walk every day. Try for at least 30 minutes on most days of the week. You also may want to swim, bike, or do other activities. · Avoid or limit alcohol. Talk to your doctor about whether you can drink any alcohol. · Try to limit how much sodium you eat to less than 2,300 milligrams (mg) a day. Your doctor may ask you to try to eat less than 1,500 mg a day. · Eat plenty of fruits (such as bananas and oranges), vegetables, legumes, whole grains, and low-fat dairy products. · Lower the amount of saturated fat in your diet. Saturated fat is found in animal products such as milk, cheese, and meat. Limiting these foods may help you lose weight and also lower your risk for heart disease. · Do not smoke. Smoking increases your risk for heart attack and stroke. If you need help quitting, talk to your doctor about stop-smoking programs and medicines. These can increase your chances of quitting for good. When should you call for help? Call  911 anytime you think you may need emergency care. This may mean having symptoms that suggest that your blood pressure is causing a serious heart or blood vessel problem. Your blood pressure may be over 180/120. For example, call 911 if: 
  · You have symptoms of a heart attack. These may include: 
? Chest pain or pressure, or a strange feeling in the chest. 
? Sweating. ? Shortness of breath. ? Nausea or vomiting. ? Pain, pressure, or a strange feeling in the back, neck, jaw, or upper belly or in one or both shoulders or arms. ? Lightheadedness or sudden weakness.  
? A fast or irregular heartbeat.  
  · You have symptoms of a stroke. These may include: 
? Sudden numbness, tingling, weakness, or loss of movement in your face, arm, or leg, especially on only one side of your body. ? Sudden vision changes. ? Sudden trouble speaking. ? Sudden confusion or trouble understanding simple statements. ? Sudden problems with walking or balance. ? A sudden, severe headache that is different from past headaches.  
  · You have severe back or belly pain. Do not wait until your blood pressure comes down on its own. Get help right away. Call your doctor now or seek immediate care if: 
  · Your blood pressure is much higher than normal (such as 180/120 or higher), but you don't have symptoms.  
  · You think high blood pressure is causing symptoms, such as: 
? Severe headache. 
? Blurry vision. Watch closely for changes in your health, and be sure to contact your doctor if: 
  · Your blood pressure measures higher than your doctor recommends at least 2 times. That means the top number is higher or the bottom number is higher, or both.  
  · You think you may be having side effects from your blood pressure medicine. Where can you learn more? Go to http://www.gray.com/ Enter U031 in the search box to learn more about \"High Blood Pressure: Care Instructions. \" Current as of: August 31, 2020               Content Version: 12.8 © 2006-2021 [employer]. Care instructions adapted under license by [employer] (which disclaims liability or warranty for this information). If you have questions about a medical condition or this instruction, always ask your healthcare professional. [place] [address] any warranty or liability for your use of this information.

## 2021-04-20 NOTE — PROGRESS NOTES
CC:   Chief Complaint   Patient presents with    Diabetes     6 month f/u        HISTORY OF PRESENT ILLNESS  Francisco Long is a 61 y.o. male. Presents for 6 month follow up evaluation; last seen 9 months ago. He has type 2 DM, hyperlipidemia, and elevated BP.       Denies HA's, CP, SOB, heart palpitations, or leg swelling. Home BP:  158/94, 139/86. Denies polydipsia, polyuria, or hypoglycemia. A1c 6.2% today. Allergies controlled. Takes Flonase nasal spray. Gained 12 lbs over past 3 months by his home scale. Had colonoscopy and EGD with Dr. Alyssa garcia 9/2/20. Started on pantoprazole 20 mg daily; told to take for a year. Is to have a repeat EGD in 1 yr to evaluate Whitman's esophagus. Moy Vazquez Has CTS with numbness at left hand. 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 Soc Hx  Works as a  for [employer]. [employer] smoker.  Drinks 2 beers 3 times a week. ROS  A complete review of systems was performed and is negative except for those mentioned in the HPI. Patient Active Problem List   Diagnosis Code    Elevated blood pressure reading without diagnosis of hypertension R03.0    Obesity (BMI 30-39. 9) E66.9    Seasonal allergic rhinitis due to pollen J30.1    Abdominal wall hernia K43.9    Type 2 diabetes mellitus without complication, without long-term current use of insulin (HCC) E11.9    Mixed hyperlipidemia E78.2    Whitman's esophagus without dysplasia K22.70     Past Medical History:   Diagnosis Date    Whitman esophagus     Obesity     Seasonal allergic rhinitis      Allergies   Allergen Reactions    Latex Rash       Current Outpatient Medications   Medication Sig Dispense Refill    pantoprazole (PROTONIX) 40 mg tablet TAKE 1 TABLET BY MOUTH EVERY MORNING      pravastatin (PRAVACHOL) 20 mg tablet Take 1 Tab by mouth nightly.  Indications: high cholesterol 90 Tab 3    metFORMIN ER (GLUCOPHAGE XR) 500 mg tablet Take 1 Tab by mouth daily (with dinner). Indications: type 2 diabetes mellitus 90 Tab 3    fluticasone propionate (FLONASE ALLERGY RELIEF) 50 mcg/actuation nasal spray by Nasal route. PHYSICAL EXAM  Visit Vitals  BP (!) 163/76 (BP 1 Location: Right arm, BP Patient Position: Sitting, BP Cuff Size: Adult)   Pulse 75   Temp 98.3 °F (36.8 °C) (Oral)   Resp 18   Ht 5' 9\" (1.753 m)   Wt 216 lb (98 kg)   SpO2 98%   BMI 31.90 kg/m²   8 lb weight gain since last clinic visit 6 months ago    General: Obese, no distress. HEENT:  Head normocephalic/atraumatic, no scleral icterus  Lungs:  Clear to ausculation bilaterally. Good air movement. Heart:  Regular rate and rhythm, normal S1 and S2, no murmur, gallop, or rub  Extremities: No clubbing, cyanosis, or edema. Neurological: Alert and oriented. Psychiatric: Normal mood and affect. Behavior is normal.   Diabetic foot exam:     Left Foot:   Visual Exam: callous - small at great toe   Pulse DP: 2+ (normal)   Filament test: normal sensation    Vibratory sensation: normal      Right Foot:   Visual Exam: callous - small at great toe   Pulse DP: 2+ (normal)   Filament test: normal sensation    Vibratory sensation: normal      Results for orders placed or performed in visit on 04/20/21   AMB POC HEMOGLOBIN A1C   Result Value Ref Range    Hemoglobin A1c (POC) 6.2 %         ASSESSMENT AND PLAN    ICD-10-CM ICD-9-CM    1. Type 2 diabetes mellitus without complication, without long-term current use of insulin ([facility])  E11.9 250.00 AMB POC HEMOGLOBIN A1C      metFORMIN ER (GLUCOPHAGE XR) 500 mg tablet   2. Mixed hyperlipidemia  E78.2 272.2 pravastatin (PRAVACHOL) 20 mg tablet   3. Essential hypertension  I10 401.9 lisinopriL (PRINIVIL, ZESTRIL) 10 mg tablet       DIABETES FOOT EXAM   4. Whitman's esophagus without dysplasia  K22.70 530.85    5. Seasonal allergic rhinitis due to pollen  J30.1 477.0    6. Obesity (BMI 30-39. 9)  E66.9 278.00      Diagnoses and all orders for this visit:    1.  Type 2 diabetes mellitus without complication, without long-term current use of insulin (HCC)  Controlled. A1c 6.2% today. -     AMB POC HEMOGLOBIN A1C  -     Refill metFORMIN ER (GLUCOPHAGE XR) 500 mg tablet; Take 1 Tab by mouth daily (with dinner). Indications: type 2 diabetes mellitus    2. Mixed hyperlipidemia  Last  on 12/21/20.  -     Refill pravastatin (PRAVACHOL) 20 mg tablet; Take 1 Tab by mouth nightly. Indications: high cholesterol    3. Essential hypertension, newly diagnosed  Uncontrolled. /76 today. -     Start lisinopriL (PRINIVIL, ZESTRIL) 10 mg tablet; Take 1 Tab by mouth daily. Indications: high blood pressure    4. Whitman's esophagus without dysplasia  Asymptomatic. Continue pantoprazole. 5. Seasonal allergic rhinitis due to pollen  Controlled with Flonase nasal spray. 6. Obesity (BMI 30-39. 9)  Counseled on diet, exercise, and weight loss. Follow-up and Dispositions    · Return in about 2 months (around 6/20/2021), or if symptoms worsen or fail to improve, for HTN. I have discussed the diagnosis with the patient and the intended plan as seen in the above orders. Patient is in agreement. The patient has received an after-visit summary and questions were answered concerning future plans. I have discussed medication side effects and warnings with the patient as well.

## 2021-04-20 NOTE — PROGRESS NOTES
Room 3A     Identified pt with two pt identifiers(name and ). Reviewed record in preparation for visit and have obtained necessary documentation. All patient medications has been reviewed. Chief Complaint   Patient presents with    Diabetes     6 month f/u        3 most recent PHQ Screens 2021   Little interest or pleasure in doing things Not at all   Feeling down, depressed, irritable, or hopeless Not at all   Total Score PHQ 2 0     Abuse Screening Questionnaire 2021   Do you ever feel afraid of your partner? N   Are you in a relationship with someone who physically or mentally threatens you? N   Is it safe for you to go home? Y       Health Maintenance Due   Topic    Shingrix Vaccine Age 50> (1 of 2)    Foot Exam Q1     COVID-19 Vaccine (2 - Pfizer 2-dose series)         Health Maintenance Review: Patient reminded of \"due or due soon\" health maintenance. I have asked the patient to contact his/her primary care provider (PCP) for follow-up on his/her health maintenance. Vitals:    21 0759 21 0806   BP: (!) 155/79 (!) 163/76   Pulse: 75    Resp: 18    Temp: 98.3 °F (36.8 °C)    TempSrc: Oral    SpO2: 98%    Weight: 216 lb (98 kg)    Height: 5' 9\" (1.753 m)    PainSc:   0 - No pain        Wt Readings from Last 3 Encounters:   21 216 lb (98 kg)   20 208 lb 6.4 oz (94.5 kg)   20 210 lb (95.3 kg)     Temp Readings from Last 3 Encounters:   21 98.3 °F (36.8 °C) (Oral)   20 98.3 °F (36.8 °C) (Oral)   20 98 °F (36.7 °C) (Oral)     BP Readings from Last 3 Encounters:   21 (!) 163/76   20 146/87   20 130/81     Pulse Readings from Last 3 Encounters:   21 75   20 70   20 71       Coordination of Care Questionnaire:   1) Have you been to an emergency room, urgent care, or hospitalized since your last visit?   no       2. Have seen or consulted any other health care provider since your last visit?  NO    Advance Care Planning:   End of Life Planning: has NO advanced directive - not interested in additional information    Patient is accompanied by self I have received verbal consent from Susanna Aguilar to discuss any/all medical information while they are present in the room.

## 2021-06-21 ENCOUNTER — OFFICE VISIT (OUTPATIENT)
Dept: INTERNAL MEDICINE CLINIC | Age: 56
End: 2021-06-21
Payer: COMMERCIAL

## 2021-06-21 VITALS
WEIGHT: 211.8 LBS | TEMPERATURE: 99 F | RESPIRATION RATE: 18 BRPM | BODY MASS INDEX: 31.37 KG/M2 | HEART RATE: 88 BPM | DIASTOLIC BLOOD PRESSURE: 89 MMHG | SYSTOLIC BLOOD PRESSURE: 127 MMHG | HEIGHT: 69 IN | OXYGEN SATURATION: 97 %

## 2021-06-21 DIAGNOSIS — I10 ESSENTIAL HYPERTENSION: Primary | ICD-10-CM

## 2021-06-21 PROCEDURE — 99213 OFFICE O/P EST LOW 20 MIN: CPT | Performed by: INTERNAL MEDICINE

## 2021-06-21 RX ORDER — LISINOPRIL 20 MG/1
20 TABLET ORAL DAILY
Qty: 90 TABLET | Refills: 0 | Status: SHIPPED | OUTPATIENT
Start: 2021-06-21 | End: 2021-09-25

## 2021-06-21 NOTE — PATIENT INSTRUCTIONS
High Blood Pressure: Care Instructions Overview It's normal for blood pressure to go up and down throughout the day. But if it stays up, you have high blood pressure. Another name for high blood pressure is hypertension. Despite what a lot of people think, high blood pressure usually doesn't cause headaches or make you feel dizzy or lightheaded. It usually has no symptoms. But it does increase your risk of stroke, heart attack, and other problems. You and your doctor will talk about your risks of these problems based on your blood pressure. Your doctor will give you a goal for your blood pressure. Your goal will be based on your health and your age. Lifestyle changes, such as eating healthy and being active, are always important to help lower blood pressure. You might also take medicine to reach your blood pressure goal. 
Follow-up care is a key part of your treatment and safety. Be sure to make and go to all appointments, and call your doctor if you are having problems. It's also a good idea to know your test results and keep a list of the medicines you take. How can you care for yourself at home? Medical treatment · If you stop taking your medicine, your blood pressure will go back up. You may take one or more types of medicine to lower your blood pressure. Be safe with medicines. Take your medicine exactly as prescribed. Call your doctor if you think you are having a problem with your medicine. · Talk to your doctor before you start taking aspirin every day. Aspirin can help certain people lower their risk of a heart attack or stroke. But taking aspirin isn't right for everyone, because it can cause serious bleeding. · See your doctor regularly. You may need to see the doctor more often at first or until your blood pressure comes down. · If you are taking blood pressure medicine, talk to your doctor before you take decongestants or anti-inflammatory medicine, such as ibuprofen.  Some of these medicines can raise blood pressure. · Learn how to check your blood pressure at home. Lifestyle changes · Stay at a healthy weight. This is especially important if you put on weight around the waist. Losing even 10 pounds can help you lower your blood pressure. · If your doctor recommends it, get more exercise. Walking is a good choice. Bit by bit, increase the amount you walk every day. Try for at least 30 minutes on most days of the week. You also may want to swim, bike, or do other activities. · Avoid or limit alcohol. Talk to your doctor about whether you can drink any alcohol. · Try to limit how much sodium you eat to less than 2,300 milligrams (mg) a day. Your doctor may ask you to try to eat less than 1,500 mg a day. · Eat plenty of fruits (such as bananas and oranges), vegetables, legumes, whole grains, and low-fat dairy products. · Lower the amount of saturated fat in your diet. Saturated fat is found in animal products such as milk, cheese, and meat. Limiting these foods may help you lose weight and also lower your risk for heart disease. · Do not smoke. Smoking increases your risk for heart attack and stroke. If you need help quitting, talk to your doctor about stop-smoking programs and medicines. These can increase your chances of quitting for good. When should you call for help? Call  911 anytime you think you may need emergency care. This may mean having symptoms that suggest that your blood pressure is causing a serious heart or blood vessel problem. Your blood pressure may be over 180/120. For example, call 911 if: 
  · You have symptoms of a heart attack. These may include: 
? Chest pain or pressure, or a strange feeling in the chest. 
? Sweating. ? Shortness of breath. ? Nausea or vomiting. ? Pain, pressure, or a strange feeling in the back, neck, jaw, or upper belly or in one or both shoulders or arms. ? Lightheadedness or sudden weakness.  
? A fast or irregular heartbeat.  
  · You have symptoms of a stroke. These may include: 
? Sudden numbness, tingling, weakness, or loss of movement in your face, arm, or leg, especially on only one side of your body. ? Sudden vision changes. ? Sudden trouble speaking. ? Sudden confusion or trouble understanding simple statements. ? Sudden problems with walking or balance. ? A sudden, severe headache that is different from past headaches.  
  · You have severe back or belly pain. Do not wait until your blood pressure comes down on its own. Get help right away. Call your doctor now or seek immediate care if: 
  · Your blood pressure is much higher than normal (such as 180/120 or higher), but you don't have symptoms.  
  · You think high blood pressure is causing symptoms, such as: 
? Severe headache. 
? Blurry vision. Watch closely for changes in your health, and be sure to contact your doctor if: 
  · Your blood pressure measures higher than your doctor recommends at least 2 times. That means the top number is higher or the bottom number is higher, or both.  
  · You think you may be having side effects from your blood pressure medicine. Where can you learn more? Go to http://www.hanley.com/ Enter K671 in the search box to learn more about \"High Blood Pressure: Care Instructions. \" Current as of: August 31, 2020               Content Version: 12.8 © 2006-2021 [employer]. Care instructions adapted under license by [employer] (which disclaims liability or warranty for this information). If you have questions about a medical condition or this instruction, always ask your healthcare professional. [place] [address] any warranty or liability for your use of this information.

## 2021-06-21 NOTE — PROGRESS NOTES
CC:   Chief Complaint   Patient presents with    Hypertension     2 month f/u       HISTORY OF PRESENT ILLNESS  Tang Tinoco is a 61 y.o. male. Presents for 2 month follow up evaluation of HTN. At last clinic visit, /76. Diagnosed with HTN. Started on lisinopril 10 mg daily. Home /78 at home this morning using arm BP monitor. Tolerating lisinopril with no side effects. Tries to stay on a low-salt diet. Denies HA's, CP, SOB, dizziness, or leg swelling. Complains of having a cold for past week that started after traveling to [place]. Has cough and head congestion. Not taking anything but improving on its own. COVID -19 vaccine: 2/2 Kaiden Peters, last 5/24/21     Patient Active Problem List   Diagnosis Code    Elevated blood pressure reading without diagnosis of hypertension R03.0    Obesity (BMI 30-39. 9) E66.9    Seasonal allergic rhinitis due to pollen J30.1    Abdominal wall hernia K43.9    Type 2 diabetes mellitus without complication, without long-term current use of insulin (HCC) E11.9    Mixed hyperlipidemia E78.2    Whitman's esophagus without dysplasia K22.70    Essential hypertension I10     Past Medical History:   Diagnosis Date    Whitman esophagus     Obesity     Seasonal allergic rhinitis      Allergies   Allergen Reactions    Latex Rash       Current Outpatient Medications   Medication Sig Dispense Refill    pantoprazole (PROTONIX) 40 mg tablet TAKE 1 TABLET BY MOUTH EVERY MORNING      pravastatin (PRAVACHOL) 20 mg tablet Take 1 Tab by mouth nightly. Indications: high cholesterol 30 Tab 11    metFORMIN ER (GLUCOPHAGE XR) 500 mg tablet Take 1 Tab by mouth daily (with dinner). Indications: type 2 diabetes mellitus 30 Tab 11    lisinopriL (PRINIVIL, ZESTRIL) 10 mg tablet Take 1 Tab by mouth daily. Indications: high blood pressure 30 Tab 2    fluticasone propionate (FLONASE ALLERGY RELIEF) 50 mcg/actuation nasal spray by Nasal route.            PHYSICAL EXAM  Visit Vitals  BP (!) 159/82 (BP 1 Location: Right arm, BP Patient Position: Sitting)   Pulse 88   Temp 99 °F (37.2 °C) (Oral)   Resp 18   Ht 5' 9\" (1.753 m)   Wt 211 lb 12.8 oz (96.1 kg)   SpO2 97%   BMI 31.28 kg/m²   5 lb weight decrease since last clinic visit  Repeat /89    General: Obese, no distress. HEENT:  Head normocephalic/atraumatic, no scleral icterus  Lungs:  Clear to ausculation bilaterally. Good air movement. Heart:  Regular rate and rhythm, normal S1 and S2, no murmur, gallop, or rub  Extremities: No clubbing, cyanosis, or edema. Neurological: Alert and oriented. Psychiatric: Normal mood and affect. Behavior is normal.         ASSESSMENT AND PLAN    ICD-10-CM ICD-9-CM    1. Essential hypertension  I10 401.9 lisinopriL (PRINIVIL, ZESTRIL) 20 mg tablet     Diagnoses and all orders for this visit:    1. Essential hypertension  Improving but still a little high. Did not come down until he sat for 5 mins. Increase lisinopril from 10 to 20 mg daily.    -     Start lisinopriL (PRINIVIL, ZESTRIL) 20 mg tablet; Take 1 Tablet by mouth daily. Indications: high blood pressure      Follow-up and Dispositions    · Return in about 2 months (around 8/21/2021), or if symptoms worsen or fail to improve, for DM, HTN, POC A1c. I have discussed the diagnosis with the patient and the intended plan as seen in the above orders. Patient is in agreement. The patient has received an after-visit summary and questions were answered concerning future plans. I have discussed medication side effects and warnings with the patient as well.

## 2021-06-21 NOTE — PROGRESS NOTES
Chief Complaint   Patient presents with    Hypertension     2 month f/u       1. Have you been to the ER, urgent care clinic since your last visit? Hospitalized since your last visit? No    2. Have you seen or consulted any other health care providers outside of the [address] [place] [address] since your last visit? Include any pap smears or colon screening.  No    Visit Vitals  BP (!) 159/82 (BP 1 Location: Right arm, BP Patient Position: Sitting)   Pulse 88   Temp 99 °F (37.2 °C) (Oral)   Resp 18   Ht 5' 9\" (1.753 m)   Wt 211 lb 12.8 oz (96.1 kg)   SpO2 97%   BMI 31.28 kg/m² English

## 2021-08-26 ENCOUNTER — OFFICE VISIT (OUTPATIENT)
Dept: INTERNAL MEDICINE CLINIC | Age: 56
End: 2021-08-26
Payer: COMMERCIAL

## 2021-08-26 VITALS
TEMPERATURE: 99 F | WEIGHT: 207 LBS | OXYGEN SATURATION: 100 % | BODY MASS INDEX: 30.66 KG/M2 | DIASTOLIC BLOOD PRESSURE: 82 MMHG | HEIGHT: 69 IN | HEART RATE: 86 BPM | SYSTOLIC BLOOD PRESSURE: 121 MMHG | RESPIRATION RATE: 16 BRPM

## 2021-08-26 DIAGNOSIS — E78.2 MIXED HYPERLIPIDEMIA: ICD-10-CM

## 2021-08-26 DIAGNOSIS — E11.9 TYPE 2 DIABETES MELLITUS WITHOUT COMPLICATION, WITHOUT LONG-TERM CURRENT USE OF INSULIN (HCC): Primary | ICD-10-CM

## 2021-08-26 DIAGNOSIS — J30.9 ALLERGIC RHINITIS WITH POSTNASAL DRIP: ICD-10-CM

## 2021-08-26 DIAGNOSIS — Z12.5 SCREENING FOR PROSTATE CANCER: ICD-10-CM

## 2021-08-26 DIAGNOSIS — J45.20 MILD INTERMITTENT REACTIVE AIRWAY DISEASE WITHOUT COMPLICATION: ICD-10-CM

## 2021-08-26 DIAGNOSIS — R09.82 ALLERGIC RHINITIS WITH POSTNASAL DRIP: ICD-10-CM

## 2021-08-26 DIAGNOSIS — I10 ESSENTIAL HYPERTENSION: ICD-10-CM

## 2021-08-26 LAB — HBA1C MFR BLD HPLC: 6 %

## 2021-08-26 PROCEDURE — 99214 OFFICE O/P EST MOD 30 MIN: CPT | Performed by: INTERNAL MEDICINE

## 2021-08-26 PROCEDURE — 83036 HEMOGLOBIN GLYCOSYLATED A1C: CPT | Performed by: INTERNAL MEDICINE

## 2021-08-26 RX ORDER — MINERAL OIL
ENEMA (ML) RECTAL
Qty: 30 TABLET | Refills: 2
Start: 2021-08-26 | End: 2021-12-17 | Stop reason: ALTCHOICE

## 2021-08-26 RX ORDER — ALBUTEROL SULFATE 90 UG/1
2 AEROSOL, METERED RESPIRATORY (INHALATION)
Qty: 1 INHALER | Refills: 0 | Status: SHIPPED | OUTPATIENT
Start: 2021-08-26 | End: 2021-09-23 | Stop reason: SDUPTHER

## 2021-08-26 NOTE — PATIENT INSTRUCTIONS

## 2021-08-26 NOTE — PROGRESS NOTES
CC:   Chief Complaint   Patient presents with    Diabetes     Poc A1c     Hypertension       HISTORY OF PRESENT ILLNESS  Tessa Sierra is a 61 y.o. male. Presents for 2 month follow up evaluation of DM and HTN. Complains of postnasal drainage, chest congestion, and cough productive of clear sputum for 3 weeks. Symptoms worse in mornings, also has wheezing with mild SOB in mornings. Flonase nasal spray not helping enough. Claritin makes him feel hyperactive. /71 at home this am.  Denies polydipsia, polyuria, hypoglycemia, CP, or leg swelling. ROS  A complete review of systems was performed and is negative except for those mentioned in the HPI. Patient Active Problem List   Diagnosis Code    Elevated blood pressure reading without diagnosis of hypertension R03.0    Obesity (BMI 30-39. 9) E66.9    Seasonal allergic rhinitis due to pollen J30.1    Abdominal wall hernia K43.9    Type 2 diabetes mellitus without complication, without long-term current use of insulin (HCC) E11.9    Mixed hyperlipidemia E78.2    Whitamn's esophagus without dysplasia K22.70    Essential hypertension I10     Past Medical History:   Diagnosis Date    Whitman esophagus     Obesity     Seasonal allergic rhinitis      Allergies   Allergen Reactions    Latex Rash       Current Outpatient Medications   Medication Sig Dispense Refill    lisinopriL (PRINIVIL, ZESTRIL) 20 mg tablet Take 1 Tablet by mouth daily. Indications: high blood pressure 90 Tablet 0    pantoprazole (PROTONIX) 40 mg tablet TAKE 1 TABLET BY MOUTH EVERY MORNING      pravastatin (PRAVACHOL) 20 mg tablet Take 1 Tab by mouth nightly. Indications: high cholesterol 30 Tab 11    metFORMIN ER (GLUCOPHAGE XR) 500 mg tablet Take 1 Tab by mouth daily (with dinner). Indications: type 2 diabetes mellitus 30 Tab 11    fluticasone propionate (FLONASE ALLERGY RELIEF) 50 mcg/actuation nasal spray by Nasal route.            PHYSICAL EXAM  Visit Vitals  BP 121/82 (BP 1 Location: Left arm, BP Patient Position: Sitting, BP Cuff Size: Adult)   Pulse 86   Temp 99 °F (37.2 °C) (Oral)   Resp 16   Ht 5' 9\" (1.753 m)   Wt 207 lb (93.9 kg)   SpO2 100%   BMI 30.57 kg/m²       General: Overweight, no distress. HEENT:  Head normocephalic/atraumatic, no scleral icterus  Lungs:  Clear to ausculation bilaterally. Good air movement. Heart:  Regular rate and rhythm, normal S1 and S2, no murmur, gallop, or rub  Extremities: No clubbing, cyanosis, or edema. Neurological: Alert and oriented. Psychiatric: Normal mood and affect. Behavior is normal.     Results for orders placed or performed in visit on 08/26/21   AMB POC HEMOGLOBIN A1C   Result Value Ref Range    Hemoglobin A1c (POC) 6.0 %         ASSESSMENT AND PLAN    ICD-10-CM ICD-9-CM    1. Type 2 diabetes mellitus without complication, without long-term current use of insulin (HCC)  E11.9 250.00 AMB POC HEMOGLOBIN A1C      HEMOGLOBIN A1C WITH EAG      MICROALBUMIN, UR, RAND W/ MICROALB/CREAT RATIO      HEMOGLOBIN A1C WITH EAG      MICROALBUMIN, UR, RAND W/ MICROALB/CREAT RATIO   2. Essential hypertension  G77 004.1 METABOLIC PANEL, COMPREHENSIVE      CBC WITH AUTOMATED DIFF      METABOLIC PANEL, COMPREHENSIVE      CBC WITH AUTOMATED DIFF   3. Mixed hyperlipidemia  E78.2 272.2 LIPID PANEL      LIPID PANEL   4. Allergic rhinitis with postnasal drip  J30.9 477.9 fexofenadine (ALLEGRA) 180 mg tablet    R09.82 784.91    5. Mild intermittent reactive airway disease without complication  W86.92 778.24 albuterol (PROVENTIL HFA, VENTOLIN HFA, PROAIR HFA) 90 mcg/actuation inhaler   6. Screening for prostate cancer  Z12.5 V76.44 PSA SCREENING (SCREENING)      PSA SCREENING (SCREENING)     Diagnoses and all orders for this visit:    1. Type 2 diabetes mellitus without complication, without long-term current use of insulin (HCC)  A1c 6.0%. Controlled on metformin.  -     AMB POC HEMOGLOBIN A1C  -     HEMOGLOBIN A1C WITH EAG;  Future  - MICROALBUMIN, UR, RAND W/ MICROALB/CREAT RATIO; Future    2. Essential hypertension  Well-controlled. Continue lisinopril 20 mg daily.  -     METABOLIC PANEL, COMPREHENSIVE; Future  -     CBC WITH AUTOMATED DIFF; Future    3. Mixed hyperlipidemia  On pravastatin 20 mg nightly. Reluctant to increase dose. -     LIPID PANEL; Future    4. Allergic rhinitis with postnasal drip  -     Start fexofenadine (ALLEGRA) 180 mg tablet; For postnasal drainage. Buy from any drug store. 5. Mild intermittent reactive airway disease without complication  -     Start albuterol (PROVENTIL HFA, VENTOLIN HFA, PROAIR HFA) 90 mcg/actuation inhaler; Take 2 Puffs by inhalation every six (6) hours as needed for Wheezing. 6. Screening for prostate cancer  -     PSA SCREENING (SCREENING); Future      Follow-up and Dispositions    · Return in about 4 months (around 12/26/2021), or if symptoms worsen or fail to improve, for HTN, DM, chol; have fasting labs at [facility] 1 week before next appointment. I have discussed the diagnosis with the patient and the intended plan as seen in the above orders. Patient is in agreement. The patient has received an after-visit summary and questions were answered concerning future plans. I have discussed medication side effects and warnings with the patient as well.

## 2021-08-26 NOTE — PROGRESS NOTES
Debora Arriaza  Identified pt with two pt identifiers(name and ). Chief Complaint   Patient presents with    Diabetes     Poc A1c     Hypertension       Reviewed record In preparation for visit and have obtained necessary documentation. 1. Have you been to the ER, urgent care clinic or hospitalized since your last visit? No     2. Have you seen or consulted any other health care providers outside of the [address] [place] [address] since your last visit? Include any pap smears or colon screening. No    Patient does not have an advance directive. Vitals reviewed with provider. Health Maintenance reviewed:     Health Maintenance Due   Topic    COVID-19 Vaccine (2 - Pfizer 2-dose series)          Wt Readings from Last 3 Encounters:   21 207 lb (93.9 kg)   21 211 lb 12.8 oz (96.1 kg)   21 216 lb (98 kg)        Temp Readings from Last 3 Encounters:   21 99 °F (37.2 °C) (Oral)   21 99 °F (37.2 °C) (Oral)   21 98.3 °F (36.8 °C) (Oral)        BP Readings from Last 3 Encounters:   21 121/82   21 127/89   21 (!) 163/76        Pulse Readings from Last 3 Encounters:   21 86   21 88   21 75        Vitals:    21 0747   BP: 121/82   Pulse: 86   Resp: 16   Temp: 99 °F (37.2 °C)   TempSrc: Oral   SpO2: (!) 10%   Weight: 207 lb (93.9 kg)   Height: 5' 9\" (1.753 m)   PainSc:   0 - No pain          Learning Assessment:   :       Learning Assessment 10/24/2019   PRIMARY LEARNER Patient   PRIMARY LANGUAGE ENGLISH   LEARNER PREFERENCE PRIMARY LISTENING   ANSWERED BY patient s   RELATIONSHIP SELF        Depression Screening:   :       3 most recent PHQ Screens 2021   Little interest or pleasure in doing things Not at all   Feeling down, depressed, irritable, or hopeless Not at all   Total Score PHQ 2 0        Fall Risk Assessment:   :       Fall Risk Assessment, last 12 mths 2021   Able to walk?  Yes   Fall in past 12 months? 0   Do you feel unsteady? 0   Are you worried about falling 0        Abuse Screening:   :       Abuse Screening Questionnaire 4/20/2021 10/24/2019   Do you ever feel afraid of your partner? N N   Are you in a relationship with someone who physically or mentally threatens you? N N   Is it safe for you to go home?  Y Y        ADL Screening:   :       ADL Assessment 10/24/2019   Feeding yourself No Help Needed   Getting from bed to chair No Help Needed   Getting dressed No Help Needed   Bathing or showering No Help Needed   Walk across the room (includes cane/walker) No Help Needed   Using the telphone No Help Needed   Taking your medications No Help Needed   Preparing meals No Help Needed   Managing money (expenses/bills) No Help Needed   Moderately strenuous housework (laundry) No Help Needed   Shopping for personal items (toiletries/medicines) No Help Needed   Shopping for groceries No Help Needed   Driving No Help Needed   Climbing a flight of stairs No Help Needed   Getting to places beyond walking distances No Help Needed

## 2021-09-23 DIAGNOSIS — J45.20 MILD INTERMITTENT REACTIVE AIRWAY DISEASE WITHOUT COMPLICATION: ICD-10-CM

## 2021-09-23 RX ORDER — ALBUTEROL SULFATE 90 UG/1
2 AEROSOL, METERED RESPIRATORY (INHALATION)
Qty: 1 EACH | Refills: 1 | Status: SHIPPED | OUTPATIENT
Start: 2021-09-23

## 2021-09-23 NOTE — TELEPHONE ENCOUNTER
[facility] did not get the RX. PCP: Doran Burkitt, MD     Last appt: 8/26/2021   Future Appointments   Date Time Provider Adrienne Feliciano   12/17/2021  7:50 AM Doran Burkitt, MD [facility] BS AMB        Requested Prescriptions     Pending Prescriptions Disp Refills    albuterol (PROVENTIL HFA, VENTOLIN HFA, PROAIR HFA) 90 mcg/actuation inhaler 1 Each 1     Sig: Take 2 Puffs by inhalation every six (6) hours as needed for Wheezing.

## 2021-12-14 LAB
ALBUMIN SERPL-MCNC: 4.6 G/DL (ref 3.8–4.9)
ALBUMIN/CREAT UR: <5 MG/G CREAT (ref 0–29)
ALBUMIN/GLOB SERPL: 1.6 {RATIO} (ref 1.2–2.2)
ALP SERPL-CCNC: 77 IU/L (ref 44–121)
ALT SERPL-CCNC: 20 IU/L (ref 0–44)
AST SERPL-CCNC: 14 IU/L (ref 0–40)
BASOPHILS # BLD AUTO: 0 X10E3/UL (ref 0–0.2)
BASOPHILS NFR BLD AUTO: 0 %
BILIRUB SERPL-MCNC: 0.3 MG/DL (ref 0–1.2)
BUN SERPL-MCNC: 24 MG/DL (ref 6–24)
BUN/CREAT SERPL: 21 (ref 9–20)
CALCIUM SERPL-MCNC: 9.9 MG/DL (ref 8.7–10.2)
CHLORIDE SERPL-SCNC: 97 MMOL/L (ref 96–106)
CHOLEST SERPL-MCNC: 202 MG/DL (ref 100–199)
CO2 SERPL-SCNC: 26 MMOL/L (ref 20–29)
CREAT SERPL-MCNC: 1.15 MG/DL (ref 0.76–1.27)
CREAT UR-MCNC: 64 MG/DL
EOSINOPHIL # BLD AUTO: 0.1 X10E3/UL (ref 0–0.4)
EOSINOPHIL NFR BLD AUTO: 1 %
ERYTHROCYTE [DISTWIDTH] IN BLOOD BY AUTOMATED COUNT: 12.4 % (ref 11.6–15.4)
EST. AVERAGE GLUCOSE BLD GHB EST-MCNC: 131 MG/DL
GLOBULIN SER CALC-MCNC: 2.8 G/DL (ref 1.5–4.5)
GLUCOSE SERPL-MCNC: 144 MG/DL (ref 65–99)
HBA1C MFR BLD: 6.2 % (ref 4.8–5.6)
HCT VFR BLD AUTO: 41.9 % (ref 37.5–51)
HDLC SERPL-MCNC: 45 MG/DL
HGB BLD-MCNC: 14.3 G/DL (ref 13–17.7)
IMM GRANULOCYTES # BLD AUTO: 0 X10E3/UL (ref 0–0.1)
IMM GRANULOCYTES NFR BLD AUTO: 0 %
LDLC SERPL CALC-MCNC: 133 MG/DL (ref 0–99)
LYMPHOCYTES # BLD AUTO: 1.8 X10E3/UL (ref 0.7–3.1)
LYMPHOCYTES NFR BLD AUTO: 28 %
MCH RBC QN AUTO: 30.6 PG (ref 26.6–33)
MCHC RBC AUTO-ENTMCNC: 34.1 G/DL (ref 31.5–35.7)
MCV RBC AUTO: 90 FL (ref 79–97)
MICROALBUMIN UR-MCNC: <3 UG/ML
MONOCYTES # BLD AUTO: 0.8 X10E3/UL (ref 0.1–0.9)
MONOCYTES NFR BLD AUTO: 12 %
NEUTROPHILS # BLD AUTO: 3.8 X10E3/UL (ref 1.4–7)
NEUTROPHILS NFR BLD AUTO: 59 %
PLATELET # BLD AUTO: 162 X10E3/UL (ref 150–450)
POTASSIUM SERPL-SCNC: 4.9 MMOL/L (ref 3.5–5.2)
PROT SERPL-MCNC: 7.4 G/DL (ref 6–8.5)
PSA SERPL-MCNC: 1.5 NG/ML (ref 0–4)
RBC # BLD AUTO: 4.67 X10E6/UL (ref 4.14–5.8)
SODIUM SERPL-SCNC: 136 MMOL/L (ref 134–144)
TRIGL SERPL-MCNC: 135 MG/DL (ref 0–149)
VLDLC SERPL CALC-MCNC: 24 MG/DL (ref 5–40)
WBC # BLD AUTO: 6.5 X10E3/UL (ref 3.4–10.8)

## 2021-12-17 ENCOUNTER — OFFICE VISIT (OUTPATIENT)
Dept: INTERNAL MEDICINE CLINIC | Age: 56
End: 2021-12-17
Payer: COMMERCIAL

## 2021-12-17 VITALS
DIASTOLIC BLOOD PRESSURE: 80 MMHG | OXYGEN SATURATION: 98 % | WEIGHT: 209.2 LBS | HEIGHT: 69 IN | TEMPERATURE: 98.6 F | HEART RATE: 83 BPM | RESPIRATION RATE: 16 BRPM | BODY MASS INDEX: 30.98 KG/M2 | SYSTOLIC BLOOD PRESSURE: 137 MMHG

## 2021-12-17 DIAGNOSIS — E66.9 OBESITY (BMI 30-39.9): ICD-10-CM

## 2021-12-17 DIAGNOSIS — E78.2 MIXED HYPERLIPIDEMIA: ICD-10-CM

## 2021-12-17 DIAGNOSIS — I10 ESSENTIAL HYPERTENSION: ICD-10-CM

## 2021-12-17 DIAGNOSIS — E11.9 TYPE 2 DIABETES MELLITUS WITHOUT COMPLICATION, WITHOUT LONG-TERM CURRENT USE OF INSULIN (HCC): Primary | ICD-10-CM

## 2021-12-17 DIAGNOSIS — K22.70 BARRETT'S ESOPHAGUS WITHOUT DYSPLASIA: ICD-10-CM

## 2021-12-17 PROCEDURE — 99214 OFFICE O/P EST MOD 30 MIN: CPT | Performed by: INTERNAL MEDICINE

## 2021-12-17 RX ORDER — PRAVASTATIN SODIUM 20 MG/1
40 TABLET ORAL
Qty: 60 TABLET | Refills: 11
Start: 2021-12-17 | End: 2022-01-06 | Stop reason: SDUPTHER

## 2021-12-17 NOTE — PATIENT INSTRUCTIONS
High Cholesterol: Care Instructions  Overview     Cholesterol is a type of fat in your blood. It is needed for many body functions, such as making new cells. Cholesterol is made by your body. It also comes from food you eat. High cholesterol means that you have too much of the fat in your blood. This raises your risk of a heart attack and stroke. LDL and HDL are part of your total cholesterol. LDL is the \"bad\" cholesterol. High LDL can raise your risk for coronary artery disease, heart attack, and stroke. HDL is the \"good\" cholesterol. It helps clear bad cholesterol from the body. High HDL is linked with a lower risk of coronary artery disease, heart attack, and stroke. Your cholesterol levels help your doctor find out your risk for having a heart attack or stroke. You and your doctor can talk about whether you need to lower your risk and what treatment is best for you. Treatment options include a heart-healthy lifestyle and medicine. Both options can help lower your cholesterol and your risk. The way you choose to lower your risk will depend on how high your risk is for heart attack and stroke. It will also depend on how you feel about taking medicines. Follow-up care is a key part of your treatment and safety. Be sure to make and go to all appointments, and call your doctor if you are having problems. It's also a good idea to know your test results and keep a list of the medicines you take. How can you care for yourself at home? · Eat heart-healthy foods. ? Eat fruits, vegetables, whole grains, beans, and other high-fiber foods. ? Eat lean proteins, such as seafood, lean meats, beans, nuts, and soy products. ? Eat healthy fats, such as canola and olive oil. ? Choose foods that are low in saturated fat. ? Limit sodium and alcohol. ? Limit drinks and foods with added sugar. · Be physically active. Try to do moderate activity at least 2½ hours a week.  Or try to do vigorous activity at least 1¼ hours a week. You may want to walk or try other activities, such as running, swimming, cycling, or playing tennis or team sports. · Stay at a healthy weight or lose weight by making the changes in eating and physical activity listed above. Losing just a small amount of weight, even 5 to 10 pounds, can help reduce your risk for having a heart attack or stroke. · Do not smoke. · Manage other health problems. These include diabetes and high blood pressure. If you think you may have a problem with alcohol or drug use, talk to your doctor. · If you take medicine, take it exactly as prescribed. Call your doctor if you think you are having a problem with your medicine. · Check with your doctor or pharmacist before you use any other medicines, including over-the-counter medicines. Make sure your doctor knows all of the medicines, vitamins, herbal products, and supplements you take. Taking some medicines together can cause problems. When should you call for help? Watch closely for changes in your health, and be sure to contact your doctor if:    · You need help making lifestyle changes.     · You have questions about your medicine. Where can you learn more? Go to http://www.gray.com/  Enter C859 in the search box to learn more about \"High Cholesterol: Care Instructions. \"  Current as of: April 29, 2021               Content Version: 13.0  © 2006-2021 [employer]. Care instructions adapted under license by [employer] (which disclaims liability or warranty for this information). If you have questions about a medical condition or this instruction, always ask your healthcare professional. [place] [address] any warranty or liability for your use of this information.

## 2021-12-17 NOTE — PROGRESS NOTES
Gabby Fagan  Identified pt with two pt identifiers(name and ). Chief Complaint   Patient presents with    Hypertension     Room 3A //    Diabetes    Cholesterol Problem       Reviewed record In preparation for visit and have obtained necessary documentation. 1. Have you been to the ER, urgent care clinic or hospitalized since your last visit? Yes. [facility]    2. Have you seen or consulted any other health care providers outside of the [address] [place] [address] since your last visit? Include any pap smears or colon screening. No    Patient does not have an advance directive. Vitals reviewed with provider. Health Maintenance reviewed:     Health Maintenance Due   Topic    Eye Exam Retinal or Dilated           Wt Readings from Last 3 Encounters:   21 209 lb 3.2 oz (94.9 kg)   21 207 lb (93.9 kg)   21 211 lb 12.8 oz (96.1 kg)        Temp Readings from Last 3 Encounters:   21 98.6 °F (37 °C) (Oral)   21 99 °F (37.2 °C) (Oral)   21 99 °F (37.2 °C) (Oral)        BP Readings from Last 3 Encounters:   21 137/80   21 121/82   21 127/89        Pulse Readings from Last 3 Encounters:   21 83   21 86   21 88        Vitals:    21 0739   BP: 137/80   Pulse: 83   Resp: 16   Temp: 98.6 °F (37 °C)   TempSrc: Oral   SpO2: 98%   Weight: 209 lb 3.2 oz (94.9 kg)   Height: 5' 9\" (1.753 m)   PainSc:   0 - No pain          Learning Assessment:   :       Learning Assessment 10/24/2019   PRIMARY LEARNER Patient   PRIMARY LANGUAGE ENGLISH   LEARNER PREFERENCE PRIMARY LISTENING   ANSWERED BY patient s   RELATIONSHIP SELF        Depression Screening:   :       3 most recent PHQ Screens 2021   Little interest or pleasure in doing things Not at all   Feeling down, depressed, irritable, or hopeless Not at all   Total Score PHQ 2 0        Fall Risk Assessment:   :       Fall Risk Assessment, last 12 mths 2021   Able to walk?  Yes Fall in past 12 months? 0   Do you feel unsteady? 0   Are you worried about falling 0        Abuse Screening:   :       Abuse Screening Questionnaire 4/20/2021 10/24/2019   Do you ever feel afraid of your partner? N N   Are you in a relationship with someone who physically or mentally threatens you? N N   Is it safe for you to go home?  Y Y        ADL Screening:   :       ADL Assessment 10/24/2019   Feeding yourself No Help Needed   Getting from bed to chair No Help Needed   Getting dressed No Help Needed   Bathing or showering No Help Needed   Walk across the room (includes cane/walker) No Help Needed   Using the telphone No Help Needed   Taking your medications No Help Needed   Preparing meals No Help Needed   Managing money (expenses/bills) No Help Needed   Moderately strenuous housework (laundry) No Help Needed   Shopping for personal items (toiletries/medicines) No Help Needed   Shopping for groceries No Help Needed   Driving No Help Needed   Climbing a flight of stairs No Help Needed   Getting to places beyond walking distances No Help Needed

## 2021-12-17 NOTE — PROGRESS NOTES
CC:   Chief Complaint   Patient presents with    Hypertension     Room 3A //    Diabetes    Cholesterol Problem       HISTORY OF PRESENT ILLNESS  Jordan Trejo is a 61 y.o. male. Presents for 4 month follow up evaluation. He has type 2 DM, HTN, and hyperlipidemia. No complaints. Denies polydipsia, polyuria, CP, SOB, or leg swelling. COVID booster vaccine: 12/2/21  Diabetic eye exam: due for this    ROS  A complete review of systems was performed and is negative except for those mentioned in the HPI. Patient Active Problem List   Diagnosis Code    Elevated blood pressure reading without diagnosis of hypertension R03.0    Obesity (BMI 30-39. 9) E66.9    Seasonal allergic rhinitis due to pollen J30.1    Abdominal wall hernia K43.9    Type 2 diabetes mellitus without complication, without long-term current use of insulin (HCC) E11.9    Mixed hyperlipidemia E78.2    Whitman's esophagus without dysplasia K22.70    Essential hypertension I10     Past Medical History:   Diagnosis Date    Whitman esophagus     Obesity     Seasonal allergic rhinitis      Allergies   Allergen Reactions    Latex Rash       Current Outpatient Medications   Medication Sig Dispense Refill    lisinopriL (PRINIVIL, ZESTRIL) 20 mg tablet TAKE 1 TABLET BY MOUTH DAILY FOR HIGH BLOOD PRESSURE 90 Tablet 1    albuterol (PROVENTIL HFA, VENTOLIN HFA, PROAIR HFA) 90 mcg/actuation inhaler Take 2 Puffs by inhalation every six (6) hours as needed for Wheezing. 1 Each 1    pantoprazole (PROTONIX) 40 mg tablet TAKE 1 TABLET BY MOUTH EVERY MORNING      pravastatin (PRAVACHOL) 20 mg tablet Take 1 Tab by mouth nightly. Indications: high cholesterol 30 Tab 11    metFORMIN ER (GLUCOPHAGE XR) 500 mg tablet Take 1 Tab by mouth daily (with dinner). Indications: type 2 diabetes mellitus 30 Tab 11    fluticasone propionate (FLONASE ALLERGY RELIEF) 50 mcg/actuation nasal spray by Nasal route.            PHYSICAL EXAM  Visit Vitals  /80 (BP 1 Location: Left upper arm, BP Patient Position: Sitting, BP Cuff Size: Large adult)   Pulse 83   Temp 98.6 °F (37 °C) (Oral)   Resp 16   Ht 5' 9\" (1.753 m)   Wt 209 lb 3.2 oz (94.9 kg)   SpO2 98%   BMI 30.89 kg/m²   2 lb weight increase since last clinic visit    General: Obese, no distress. HEENT:  Head normocephalic/atraumatic, no scleral icterus  Lungs:  Clear to ausculation bilaterally. Good air movement. Heart:  Regular rate and rhythm, normal S1 and S2, no murmur, gallop, or rub  Extremities: No clubbing, cyanosis, or edema. Neurological: Alert and oriented. Psychiatric: Normal mood and affect. Behavior is normal.     Results for orders placed or performed in visit on 17/90/05   METABOLIC PANEL, COMPREHENSIVE   Result Value Ref Range    Glucose 144 (H) 65 - 99 mg/dL    BUN 24 6 - 24 mg/dL    Creatinine 1.15 0.76 - 1.27 mg/dL    GFR est non-AA 70 >59 mL/min/1.73    GFR est AA 81 >59 mL/min/1.73    BUN/Creatinine ratio 21 (H) 9 - 20    Sodium 136 134 - 144 mmol/L    Potassium 4.9 3.5 - 5.2 mmol/L    Chloride 97 96 - 106 mmol/L    CO2 26 20 - 29 mmol/L    Calcium 9.9 8.7 - 10.2 mg/dL    Protein, total 7.4 6.0 - 8.5 g/dL    Albumin 4.6 3.8 - 4.9 g/dL    GLOBULIN, TOTAL 2.8 1.5 - 4.5 g/dL    A-G Ratio 1.6 1.2 - 2.2    Bilirubin, total 0.3 0.0 - 1.2 mg/dL    Alk. phosphatase 77 44 - 121 IU/L    AST (SGOT) 14 0 - 40 IU/L    ALT (SGPT) 20 0 - 44 IU/L   CBC WITH AUTOMATED DIFF   Result Value Ref Range    WBC 6.5 3.4 - 10.8 x10E3/uL    RBC 4.67 4.14 - 5.80 x10E6/uL    HGB 14.3 13.0 - 17.7 g/dL    HCT 41.9 37.5 - 51.0 %    MCV 90 79 - 97 fL    MCH 30.6 26.6 - 33.0 pg    MCHC 34.1 31.5 - 35.7 g/dL    RDW 12.4 11.6 - 15.4 %    PLATELET 932 726 - 588 x10E3/uL    NEUTROPHILS 59 Not Estab. %    Lymphocytes 28 Not Estab. %    MONOCYTES 12 Not Estab. %    EOSINOPHILS 1 Not Estab. %    BASOPHILS 0 Not Estab. %    ABS. NEUTROPHILS 3.8 1.4 - 7.0 x10E3/uL    Abs Lymphocytes 1.8 0.7 - 3.1 x10E3/uL    ABS.  MONOCYTES 0.8 0.1 - 0.9 x10E3/uL    ABS. EOSINOPHILS 0.1 0.0 - 0.4 x10E3/uL    ABS. BASOPHILS 0.0 0.0 - 0.2 x10E3/uL    IMMATURE GRANULOCYTES 0 Not Estab. %    ABS. IMM. GRANS. 0.0 0.0 - 0.1 x10E3/uL   HEMOGLOBIN A1C WITH EAG   Result Value Ref Range    Hemoglobin A1c 6.2 (H) 4.8 - 5.6 %    Estimated average glucose 131 mg/dL   MICROALBUMIN, UR, RAND W/ MICROALB/CREAT RATIO   Result Value Ref Range    Creatinine, urine 64.0 Not Estab. mg/dL    Microalbumin, urine <3.0 Not Estab. ug/mL    Microalb/Creat ratio (ug/mg creat.) <5 0 - 29 mg/g creat   LIPID PANEL   Result Value Ref Range    Cholesterol, total 202 (H) 100 - 199 mg/dL    Triglyceride 135 0 - 149 mg/dL    HDL Cholesterol 45 >39 mg/dL    VLDL, calculated 24 5 - 40 mg/dL    LDL, calculated 133 (H) 0 - 99 mg/dL   PSA SCREENING (SCREENING)   Result Value Ref Range    Prostate Specific Ag 1.5 0.0 - 4.0 ng/mL   AMB POC HEMOGLOBIN A1C   Result Value Ref Range    Hemoglobin A1c (POC) 6.0 %         ASSESSMENT AND PLAN    ICD-10-CM ICD-9-CM    1. Type 2 diabetes mellitus without complication, without long-term current use of insulin (HCC)  E11.9 250.00 HEMOGLOBIN A1C WITH EAG      HEMOGLOBIN A1C WITH EAG   2. Essential hypertension  I10 401.9    3. Mixed hyperlipidemia  E78.2 272.2 pravastatin (PRAVACHOL) 20 mg tablet      LIPID PANEL      LIPID PANEL   4. Whitman's esophagus without dysplasia  K22.70 530.85    5. Obesity (BMI 30-39. 9)  E66.9 278.00      Discussed lab results from 12/13/21 with patient. A1c 6.2%. Normal kidney and liver tests, blood counts, PSA, and urine protein test. . Tot chol 202, LDL high at 133. The 10-year ASCVD risk score (Rosana Ly, et al., 2013) is: 17.8%. Diagnoses and all orders for this visit:    1. Type 2 diabetes mellitus without complication, without long-term current use of insulin (HCC)  A1c 6.2% on 12/13/21. Controlled on metformin. Instructed to schedule dilated eye exam with eye doctor.  -     HEMOGLOBIN A1C WITH EAG; Future    2. Essential hypertension  Controlled. Continue lisinopril 20 mg daily. 3. Mixed hyperlipidemia  He was reluctant to increase pravastatin but finally agreed. -     Increase to: pravastatin (PRAVACHOL) 20 mg tablet; Take 2 Tablets by mouth nightly. Indications: high cholesterol  -     LIPID PANEL; Future    4. Whitman's esophagus without dysplasia  Continue PPI and follow up with Dr. Todd Gresham as scheduled. To have repeat EGD next year. 5. Obesity (BMI 30-39. 9)  Counseled on diet, exercise, and weight loss. Follow-up and Dispositions    · Return in about 4 months (around 4/17/2022), or if symptoms worsen or fail to improve, for DM, chol, HTN; have fasting labs 1 week prior to appointment. I have discussed the diagnosis with the patient and the intended plan as seen in the above orders. Patient is in agreement. The patient has received an after-visit summary and questions were answered concerning future plans. I have discussed medication side effects and warnings with the patient as well.

## 2021-12-17 NOTE — PROGRESS NOTES
Will discuss at 12/17/21 appt. A1c 6.2%. Normal kidney and liver tests, blood counts, PSA, and urine protein test. . Tot chol 202, LDL high at 133. The 10-year ASCVD risk score (Chao Carvalho, et al., 2013) is: 17.8%.

## 2022-01-06 DIAGNOSIS — E78.2 MIXED HYPERLIPIDEMIA: ICD-10-CM

## 2022-01-06 RX ORDER — PRAVASTATIN SODIUM 40 MG/1
40 TABLET ORAL
Qty: 30 TABLET | Refills: 11 | Status: SHIPPED | OUTPATIENT
Start: 2022-01-06

## 2022-01-06 NOTE — TELEPHONE ENCOUNTER
PCP: Rebel Shoemaker MD     Last appt: 12/17/2021   Future Appointments   Date Time Provider Adrienne Feliciano   4/18/2022  7:50 AM Rebel Shoemaker MD [facility] AMB        Requested Prescriptions     Pending Prescriptions Disp Refills    pravastatin (PRAVACHOL) 40 mg tablet 30 Tablet 11     Sig: Take 1 Tablet by mouth nightly.  Indications: high cholesterol

## 2022-03-18 PROBLEM — R03.0 ELEVATED BLOOD PRESSURE READING WITHOUT DIAGNOSIS OF HYPERTENSION: Status: ACTIVE | Noted: 2019-10-24

## 2022-03-18 PROBLEM — K43.9 ABDOMINAL WALL HERNIA: Status: ACTIVE | Noted: 2019-10-24

## 2022-03-18 PROBLEM — E11.9 TYPE 2 DIABETES MELLITUS WITHOUT COMPLICATION, WITHOUT LONG-TERM CURRENT USE OF INSULIN (HCC): Status: ACTIVE | Noted: 2020-07-07

## 2022-03-19 PROBLEM — J30.1 SEASONAL ALLERGIC RHINITIS DUE TO POLLEN: Status: ACTIVE | Noted: 2019-10-24

## 2022-03-19 PROBLEM — E78.2 MIXED HYPERLIPIDEMIA: Status: ACTIVE | Noted: 2020-07-07

## 2022-03-19 PROBLEM — E66.9 OBESITY (BMI 30-39.9): Status: ACTIVE | Noted: 2019-10-24

## 2022-03-19 PROBLEM — I10 ESSENTIAL HYPERTENSION: Status: ACTIVE | Noted: 2021-04-20

## 2022-03-20 PROBLEM — K22.70 BARRETT'S ESOPHAGUS WITHOUT DYSPLASIA: Status: ACTIVE | Noted: 2020-12-22

## 2022-04-06 DIAGNOSIS — I10 ESSENTIAL HYPERTENSION: ICD-10-CM

## 2022-04-06 RX ORDER — LISINOPRIL 20 MG/1
TABLET ORAL
Qty: 90 TABLET | Refills: 1 | Status: SHIPPED | OUTPATIENT
Start: 2022-04-06 | End: 2022-10-25

## 2022-04-25 DIAGNOSIS — E11.9 TYPE 2 DIABETES MELLITUS WITHOUT COMPLICATION, WITHOUT LONG-TERM CURRENT USE OF INSULIN (HCC): ICD-10-CM

## 2022-04-25 RX ORDER — METFORMIN HYDROCHLORIDE 500 MG/1
500 TABLET, EXTENDED RELEASE ORAL
Qty: 30 TABLET | Refills: 11 | Status: SHIPPED | OUTPATIENT
Start: 2022-04-25

## 2022-04-25 NOTE — TELEPHONE ENCOUNTER
Requested Prescriptions     Pending Prescriptions Disp Refills    metFORMIN ER (GLUCOPHAGE XR) 500 mg tablet 30 Tablet 11     Sig: Take 1 Tablet by mouth daily (with dinner).  Indications: type 2 diabetes mellitus

## 2022-04-25 NOTE — TELEPHONE ENCOUNTER
PCP: Melina Yusuf MD     Last appt: 12/17/2021   Future Appointments   Date Time Provider Adrienne Feliciano   7/12/2022  8:10 AM Melina Yusuf MD [facility] AMB        Requested Prescriptions     Pending Prescriptions Disp Refills    metFORMIN ER (GLUCOPHAGE XR) 500 mg tablet 30 Tablet 11     Sig: Take 1 Tablet by mouth daily (with dinner).  Indications: type 2 diabetes mellitus

## 2023-05-20 RX ORDER — PANTOPRAZOLE SODIUM 40 MG/1
1 TABLET, DELAYED RELEASE ORAL EVERY MORNING
COMMUNITY
Start: 2021-04-09

## 2023-05-20 RX ORDER — PRAVASTATIN SODIUM 40 MG
40 TABLET ORAL
COMMUNITY
Start: 2022-01-06 | End: 2023-06-13

## 2023-05-20 RX ORDER — FLUTICASONE PROPIONATE 50 MCG
SPRAY, SUSPENSION (ML) NASAL
COMMUNITY

## 2023-05-20 RX ORDER — ALBUTEROL SULFATE 90 UG/1
2 AEROSOL, METERED RESPIRATORY (INHALATION) EVERY 6 HOURS PRN
COMMUNITY
Start: 2021-09-23

## 2023-05-20 RX ORDER — METFORMIN HYDROCHLORIDE 500 MG/1
500 TABLET, EXTENDED RELEASE ORAL
COMMUNITY
Start: 2022-04-25 | End: 2023-06-08 | Stop reason: SDUPTHER

## 2023-05-20 RX ORDER — LISINOPRIL 20 MG/1
20 TABLET ORAL DAILY
COMMUNITY
Start: 2023-02-08 | End: 2023-06-13 | Stop reason: SDUPTHER

## 2023-06-08 RX ORDER — METFORMIN HYDROCHLORIDE 500 MG/1
500 TABLET, EXTENDED RELEASE ORAL
Qty: 30 TABLET | Refills: 0 | Status: SHIPPED | OUTPATIENT
Start: 2023-06-08

## 2023-06-08 NOTE — TELEPHONE ENCOUNTER
PCP: Ermias Gavin MD     Last appt:  12/17/2021      Future Appointments   Date Time Provider Sonia Lira   6/13/2023  3:00 PM Lefi Manuel MD [facility] BS AMB          Requested Prescriptions     Pending Prescriptions Disp Refills    metFORMIN (GLUCOPHAGE-XR) 500 MG extended release tablet 30 tablet 0     Sig: Take 1 tablet by mouth Daily with supper

## 2023-08-04 ENCOUNTER — PATIENT MESSAGE (OUTPATIENT)
Age: 58
End: 2023-08-04

## 2023-08-04 RX ORDER — METFORMIN HYDROCHLORIDE 500 MG/1
500 TABLET, EXTENDED RELEASE ORAL
Qty: 30 TABLET | Refills: 0 | Status: CANCELLED | OUTPATIENT
Start: 2023-08-04

## 2023-08-04 RX ORDER — METFORMIN HYDROCHLORIDE 500 MG/1
500 TABLET, EXTENDED RELEASE ORAL
Qty: 30 TABLET | Refills: 0 | Status: SHIPPED | OUTPATIENT
Start: 2023-08-04

## 2023-08-04 NOTE — TELEPHONE ENCOUNTER
PCP: None None    Last appt: 6/13/2023  Future Appointments   Date Time Provider [address]   12/13/2023  7:45 AM Shannan Hamm MD [facility] BS AMB       Requested Prescriptions     Pending Prescriptions Disp Refills    metFORMIN (GLUCOPHAGE-XR) 500 MG extended release tablet 30 tablet 0     Sig: Take 1 tablet by mouth Daily with supper

## 2023-08-04 NOTE — TELEPHONE ENCOUNTER
From: Madelyn Hernandez  To: Dr. Webb Shark: 8/4/2023 9:43 AM EDT  Subject: Metformin refill request     Good morning, I sent a refill request for my Metformin and for some reason the request went to Dr. Shara Lira. I'm guessing because she prescribed it last. Can you please renew the prescription?      Thank you,  Madelyn Hernandez

## 2023-08-12 DIAGNOSIS — I10 ESSENTIAL HYPERTENSION: ICD-10-CM

## 2023-08-14 RX ORDER — LISINOPRIL 10 MG/1
10 TABLET ORAL DAILY
Qty: 30 TABLET | Refills: 1 | Status: SHIPPED | OUTPATIENT
Start: 2023-08-14

## 2023-09-11 RX ORDER — METFORMIN HYDROCHLORIDE 500 MG/1
500 TABLET, EXTENDED RELEASE ORAL
Qty: 90 TABLET | Refills: 1 | Status: SHIPPED | OUTPATIENT
Start: 2023-09-11

## 2023-11-03 DIAGNOSIS — I10 ESSENTIAL HYPERTENSION: ICD-10-CM

## 2023-11-03 RX ORDER — LISINOPRIL 10 MG/1
10 TABLET ORAL DAILY
Qty: 30 TABLET | Refills: 1 | Status: SHIPPED | OUTPATIENT
Start: 2023-11-03

## 2023-12-13 ENCOUNTER — OFFICE VISIT (OUTPATIENT)
Age: 58
End: 2023-12-13
Payer: COMMERCIAL

## 2023-12-13 VITALS
SYSTOLIC BLOOD PRESSURE: 143 MMHG | TEMPERATURE: 98 F | OXYGEN SATURATION: 98 % | WEIGHT: 209 LBS | HEIGHT: 69 IN | HEART RATE: 75 BPM | RESPIRATION RATE: 18 BRPM | DIASTOLIC BLOOD PRESSURE: 80 MMHG | BODY MASS INDEX: 30.96 KG/M2

## 2023-12-13 DIAGNOSIS — E11.9 TYPE 2 DIABETES MELLITUS WITHOUT COMPLICATION, WITHOUT LONG-TERM CURRENT USE OF INSULIN (HCC): ICD-10-CM

## 2023-12-13 DIAGNOSIS — I10 ESSENTIAL HYPERTENSION: ICD-10-CM

## 2023-12-13 DIAGNOSIS — K43.9 VENTRAL HERNIA WITHOUT OBSTRUCTION OR GANGRENE: ICD-10-CM

## 2023-12-13 DIAGNOSIS — Z12.5 SPECIAL SCREENING FOR MALIGNANT NEOPLASM OF PROSTATE: ICD-10-CM

## 2023-12-13 DIAGNOSIS — Z23 NEED FOR PROPHYLACTIC VACCINATION AGAINST STREPTOCOCCUS PNEUMONIAE (PNEUMOCOCCUS): Primary | ICD-10-CM

## 2023-12-13 DIAGNOSIS — E78.2 MIXED HYPERLIPIDEMIA: ICD-10-CM

## 2023-12-13 LAB
ALBUMIN SERPL-MCNC: 4 G/DL (ref 3.5–5)
ALBUMIN/GLOB SERPL: 1.1 (ref 1.1–2.2)
ALP SERPL-CCNC: 72 U/L (ref 45–117)
ALT SERPL-CCNC: 34 U/L (ref 12–78)
ANION GAP SERPL CALC-SCNC: 5 MMOL/L (ref 5–15)
AST SERPL-CCNC: 24 U/L (ref 15–37)
BILIRUB SERPL-MCNC: 0.5 MG/DL (ref 0.2–1)
BUN SERPL-MCNC: 19 MG/DL (ref 6–20)
BUN/CREAT SERPL: 17 (ref 12–20)
CALCIUM SERPL-MCNC: 9.5 MG/DL (ref 8.5–10.1)
CHLORIDE SERPL-SCNC: 104 MMOL/L (ref 97–108)
CHOLEST SERPL-MCNC: 200 MG/DL
CO2 SERPL-SCNC: 29 MMOL/L (ref 21–32)
CREAT SERPL-MCNC: 1.13 MG/DL (ref 0.7–1.3)
GLOBULIN SER CALC-MCNC: 3.5 G/DL (ref 2–4)
GLUCOSE SERPL-MCNC: 143 MG/DL (ref 65–100)
HDLC SERPL-MCNC: 48 MG/DL
HDLC SERPL: 4.2 (ref 0–5)
LDLC SERPL CALC-MCNC: 131 MG/DL (ref 0–100)
POTASSIUM SERPL-SCNC: 4.2 MMOL/L (ref 3.5–5.1)
PROT SERPL-MCNC: 7.5 G/DL (ref 6.4–8.2)
PSA SERPL-MCNC: 1.6 NG/ML (ref 0.01–4)
SODIUM SERPL-SCNC: 138 MMOL/L (ref 136–145)
TRIGL SERPL-MCNC: 105 MG/DL
VLDLC SERPL CALC-MCNC: 21 MG/DL

## 2023-12-13 PROCEDURE — 3044F HG A1C LEVEL LT 7.0%: CPT | Performed by: STUDENT IN AN ORGANIZED HEALTH CARE EDUCATION/TRAINING PROGRAM

## 2023-12-13 PROCEDURE — 99214 OFFICE O/P EST MOD 30 MIN: CPT | Performed by: STUDENT IN AN ORGANIZED HEALTH CARE EDUCATION/TRAINING PROGRAM

## 2023-12-13 PROCEDURE — 90471 IMMUNIZATION ADMIN: CPT | Performed by: STUDENT IN AN ORGANIZED HEALTH CARE EDUCATION/TRAINING PROGRAM

## 2023-12-13 PROCEDURE — 3077F SYST BP >= 140 MM HG: CPT | Performed by: STUDENT IN AN ORGANIZED HEALTH CARE EDUCATION/TRAINING PROGRAM

## 2023-12-13 PROCEDURE — 90677 PCV20 VACCINE IM: CPT | Performed by: STUDENT IN AN ORGANIZED HEALTH CARE EDUCATION/TRAINING PROGRAM

## 2023-12-13 PROCEDURE — 3079F DIAST BP 80-89 MM HG: CPT | Performed by: STUDENT IN AN ORGANIZED HEALTH CARE EDUCATION/TRAINING PROGRAM

## 2023-12-13 RX ORDER — LISINOPRIL 10 MG/1
10 TABLET ORAL DAILY
Qty: 100 TABLET | Refills: 1 | Status: SHIPPED | OUTPATIENT
Start: 2023-12-13

## 2023-12-13 RX ORDER — METFORMIN HYDROCHLORIDE 500 MG/1
500 TABLET, EXTENDED RELEASE ORAL
Qty: 100 TABLET | Refills: 1 | Status: SHIPPED | OUTPATIENT
Start: 2023-12-13

## 2023-12-13 ASSESSMENT — PATIENT HEALTH QUESTIONNAIRE - PHQ9
1. LITTLE INTEREST OR PLEASURE IN DOING THINGS: 0
SUM OF ALL RESPONSES TO PHQ QUESTIONS 1-9: 0
SUM OF ALL RESPONSES TO PHQ QUESTIONS 1-9: 0
SUM OF ALL RESPONSES TO PHQ9 QUESTIONS 1 & 2: 0
SUM OF ALL RESPONSES TO PHQ QUESTIONS 1-9: 0
SUM OF ALL RESPONSES TO PHQ QUESTIONS 1-9: 0
2. FEELING DOWN, DEPRESSED OR HOPELESS: 0

## 2023-12-13 NOTE — PROGRESS NOTES
HISTORY OF PRESENT ILLNESS   Hernán Sunshine is a 60 y.o. male who  has a past medical history of Espitia esophagus, Obesity, and Seasonal allergic rhinitis. Pt presents for 6 mo follow up. Acute complaint of mass in groin area for the last few months. He has some pain with coughing. He has hx of ventral hernia and would like to discuss getting it repaired as well. He has had an ultrasound of it in the past and was told that if it wasn't bothering him he didn't need to get it repaired but recently his girlfriend has been advising him to get it repaired. HTN: restarted his lisinopril 20 mg at 6/31/23 visit after being out of it for months.   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 DMII: A1c 6.2% on 6/13/23 with metformin 500 mg daily and low carb diet. - TODAY reports that he has been tolerating the metformin but does not want to increase the dose as he is concerned that he may not tolerate higher doses. Mixed HLD:  on 6/13/23 w/o medication. He did not tolerate pravastatin very well. Started simvastatin at 6/13/23 visit. - TODAY states he has been tolerating current dose of statin but it is starting to cause some fatigue and does not think he will tolerate higher dose. Espitia's: following w GI, getting endoscopies regularly. HM:  Flu shot: refused  Covid: received booster 11/2024.    PNA:   Tdap:   Shingles:  RSV:  Hep C Screen:   HIV screen:  LDCT:  AAA:   PSA:  Colonoscopy: due 9/2030  microalbumin: 6/13/23  foot exam:  eye exam:  dentist:  Vera Petey: na      Active Ambulatory Problems     Diagnosis Date Noted    Type 2 diabetes mellitus without complication, without long-term current use of insulin ([address]) 07/07/2020    Elevated blood pressure reading without diagnosis of hypertension 10/24/2019    Abdominal wall hernia 10/24/2019    Seasonal allergic rhinitis due to pollen 10/24/2019    Obesity (BMI 30-39.9) 10/24/2019    Essential

## 2023-12-13 NOTE — ASSESSMENT & PLAN NOTE
Tolerating simvastatin 10 mg but does not think he can tolerate higher doses. If cholesterol remains above goal will consider zetia.

## 2023-12-14 LAB
CREAT UR-MCNC: 210 MG/DL
EST. AVERAGE GLUCOSE BLD GHB EST-MCNC: 140 MG/DL
HBA1C MFR BLD: 6.5 % (ref 4–5.6)
MICROALBUMIN UR-MCNC: 0.88 MG/DL
MICROALBUMIN/CREAT UR-RTO: 4 MG/G (ref 0–30)

## 2023-12-14 NOTE — RESULT ENCOUNTER NOTE
Your hemoglobin A1c is well controlled at 6.5%.      Your cholesterol is much improved from 6 months ago though still a little higher than ideal.     Labs are otherwise normal.

## 2024-01-15 ENCOUNTER — OFFICE VISIT (OUTPATIENT)
Age: 59
End: 2024-01-15
Payer: COMMERCIAL

## 2024-01-15 VITALS
HEIGHT: 69 IN | TEMPERATURE: 97.4 F | BODY MASS INDEX: 32.94 KG/M2 | RESPIRATION RATE: 18 BRPM | WEIGHT: 222.4 LBS | OXYGEN SATURATION: 96 % | DIASTOLIC BLOOD PRESSURE: 87 MMHG | SYSTOLIC BLOOD PRESSURE: 157 MMHG | HEART RATE: 94 BPM

## 2024-01-15 DIAGNOSIS — E78.2 MIXED HYPERLIPIDEMIA: ICD-10-CM

## 2024-01-15 DIAGNOSIS — K40.20 BILATERAL INGUINAL HERNIA WITHOUT OBSTRUCTION OR GANGRENE, RECURRENCE NOT SPECIFIED: Primary | ICD-10-CM

## 2024-01-15 PROCEDURE — 99243 OFF/OP CNSLTJ NEW/EST LOW 30: CPT | Performed by: SURGERY

## 2024-01-15 PROCEDURE — 3077F SYST BP >= 140 MM HG: CPT | Performed by: SURGERY

## 2024-01-15 PROCEDURE — 3079F DIAST BP 80-89 MM HG: CPT | Performed by: SURGERY

## 2024-01-15 RX ORDER — SIMVASTATIN 10 MG
10 TABLET ORAL NIGHTLY
Qty: 90 TABLET | Refills: 1 | Status: SHIPPED | OUTPATIENT
Start: 2024-01-15

## 2024-01-15 NOTE — PROGRESS NOTES
Identified pt with two pt identifiers(name and ). Reviewed record in preparation for visit and have obtained necessary documentation. All patient medications has been reviewed.    Chief Complaint   Patient presents with    New Patient     Seen at the request of  for ventral hernia        Health Maintenance Due   Topic    Hepatitis B vaccine (1 of 3 - 3-dose series)    HIV screen     Shingles vaccine (1 of 2)    Diabetic retinal exam     Diabetic foot exam     Flu vaccine (1)    COVID-19 Vaccine ( season)       Vitals:    01/15/24 1611   BP: (!) 157/87   Site: Left Upper Arm   Position: Sitting   Pulse: 94   Resp: 18   Temp: 97.4 °F (36.3 °C)   TempSrc: Temporal   SpO2: 96%   Weight: 100.9 kg (222 lb 6.4 oz)   Height: 1.753 m (5' 9\")         4.Have you been to the ER, urgent care clinic since your last visit?  Hospitalized since your last visit? No      5. Have you seen or consulted any other health care providers outside of the [facility] since your last visit?  Include any pap smears or colon screening. No      Patient is accompanied by self I have received verbal consent from Iban Copeland to discuss any/all medical information while they are present in the room.     Patient and provider made aware of elevated BP x2. Patient asymptomatic. Patient reminded to monitor BP, continue to take BP medications if prescribed, and follow up with PCP/Cardiologist.  Patient expressed understanding and agreement.

## 2024-02-08 NOTE — PROGRESS NOTES
To: George Richardson MD    From: George Bartholomew MD    Thank you for sending Iban Copeland to see us.     Please note that this dictation was completed with Zimride, the computer voice recognition software.  Quite often unanticipated grammatical, syntax, homophones, and other interpretive errors are inadvertently transcribed by the software.  Please disregard these errors.  Please excuse any errors that have escaped final proofreading.      Encounter Date: 1/15/2024  History and Physical    Assessment:   Bilateral inguinal hernia.  The right contains adipose and the left contains bowel.  There is some discomfort on the left.  It is reducible.  No obvious umbilical hernia.  There is a significant diastases recti.    Body mass index is 32.84 kg/m².    Comorbid diabetes, GERD with Espitia's esophagus..  No known h/o heart disease or stroke.    S/p no prior abdominal operations.    No anticoagulation.    Plan:   Recommended robotic repair with mesh.    Mesh discussed -- patient consents to its use and acknowledges its risks.      Discussed possibility of incarceration, strangulation, increase in size of the hernia over time, and the risk of emergency surgery in the face of strangulation.  Discussed techniques for reducing the hernia should it not reduce spontaneously.  Told to call or go to the emergency department for unreducible hernia with pain.    Also discussed alternatives to and risks and benefits of surgery.  Risks include recurrence, bleeding, hematoma, infection, difficulty voiding, chronic nerve pain, and the risks of general anesthetic. The patient expressed understanding of the risks and all questions were answered to the patient's satisfaction.     HPI:   Iban Copeland is a 58 y.o. male who is seen in consultation at the request of George Richardson MD for pain on the left groin.    Symptoms were first noted 6 months to a year ago.  Pain is described as nagging on the left.  He feels a squishy

## 2024-03-19 ENCOUNTER — TELEPHONE (OUTPATIENT)
Age: 59
End: 2024-03-19

## 2024-03-19 ENCOUNTER — PREP FOR PROCEDURE (OUTPATIENT)
Age: 59
End: 2024-03-19

## 2024-03-19 PROBLEM — K40.20 BILATERAL INGUINAL HERNIA: Status: ACTIVE | Noted: 2024-03-19

## 2024-04-05 ENCOUNTER — HOSPITAL ENCOUNTER (OUTPATIENT)
Facility: HOSPITAL | Age: 59
Discharge: HOME | End: 2024-04-05
Payer: COMMERCIAL

## 2024-04-05 VITALS
TEMPERATURE: 98.4 F | WEIGHT: 209.22 LBS | RESPIRATION RATE: 18 BRPM | BODY MASS INDEX: 29.95 KG/M2 | HEIGHT: 70 IN | OXYGEN SATURATION: 98 % | SYSTOLIC BLOOD PRESSURE: 155 MMHG | HEART RATE: 85 BPM | DIASTOLIC BLOOD PRESSURE: 83 MMHG

## 2024-04-05 LAB
ANION GAP SERPL CALC-SCNC: 5 MMOL/L (ref 5–15)
BUN SERPL-MCNC: 28 MG/DL (ref 6–20)
BUN/CREAT SERPL: 22 (ref 12–20)
CALCIUM SERPL-MCNC: 9.2 MG/DL (ref 8.5–10.1)
CHLORIDE SERPL-SCNC: 101 MMOL/L (ref 97–108)
CO2 SERPL-SCNC: 29 MMOL/L (ref 21–32)
CREAT SERPL-MCNC: 1.28 MG/DL (ref 0.7–1.3)
ERYTHROCYTE [DISTWIDTH] IN BLOOD BY AUTOMATED COUNT: 13 % (ref 11.5–14.5)
GLUCOSE SERPL-MCNC: 189 MG/DL (ref 65–100)
HCT VFR BLD AUTO: 43.2 % (ref 36.6–50.3)
HGB BLD-MCNC: 14.6 G/DL (ref 12.1–17)
MCH RBC QN AUTO: 30.8 PG (ref 26–34)
MCHC RBC AUTO-ENTMCNC: 33.8 G/DL (ref 30–36.5)
MCV RBC AUTO: 91.1 FL (ref 80–99)
NRBC # BLD: 0 K/UL (ref 0–0.01)
NRBC BLD-RTO: 0 PER 100 WBC
PLATELET # BLD AUTO: 179 K/UL (ref 150–400)
PMV BLD AUTO: 9.9 FL (ref 8.9–12.9)
POTASSIUM SERPL-SCNC: 4.6 MMOL/L (ref 3.5–5.1)
RBC # BLD AUTO: 4.74 M/UL (ref 4.1–5.7)
SODIUM SERPL-SCNC: 135 MMOL/L (ref 136–145)
WBC # BLD AUTO: 6.7 K/UL (ref 4.1–11.1)

## 2024-04-05 PROCEDURE — 36415 COLL VENOUS BLD VENIPUNCTURE: CPT

## 2024-04-05 PROCEDURE — 80048 BASIC METABOLIC PNL TOTAL CA: CPT

## 2024-04-05 PROCEDURE — 85027 COMPLETE CBC AUTOMATED: CPT

## 2024-04-05 RX ORDER — SODIUM CHLORIDE, SODIUM LACTATE, POTASSIUM CHLORIDE, CALCIUM CHLORIDE 600; 310; 30; 20 MG/100ML; MG/100ML; MG/100ML; MG/100ML
INJECTION, SOLUTION INTRAVENOUS CONTINUOUS
OUTPATIENT
Start: 2024-04-11

## 2024-04-05 NOTE — PERIOP NOTE
[facility]  Preoperative Instructions        Surgery Date Thursday, 4/11          Time of Arrival TBD/TBC @ [phone]    1. On the day of your surgery, please report to the Surgical Services Registration Desk and sign in at your designated time. The Surgery Center is located to the right of the Emergency Room.     2. You must have someone with you to drive you home. You should not drive a car for 24 hours following surgery. Please make arrangements for a friend or family member to stay with you for the first 24 hours after your surgery.    3. Do not have anything to eat or drink (including water, gum, mints, coffee, juice) after midnight Wednesday, 4/10.?This may not apply to medications prescribed by your physician. ?(Please note below the special instructions with medications to take the morning of your procedure.)    4. We recommend you do not drink any alcoholic beverages for 24 hours before and after your surgery.    5. Contact your surgeon’s office for instructions on the following medications: non-steroidal anti-inflammatory drugs (i.e. Advil, Aleve), vitamins, and supplements. (Some surgeon’s will want you to stop these medications prior to surgery and others may allow you to take them)  **If you are currently taking Plavix, Coumadin, Aspirin and/or other blood-thinning agents, contact your surgeon for instructions.** Your surgeon will partner with the physician prescribing these medications to determine if it is safe to stop or if you need to continue taking.  Please do not stop taking these medications without instructions from your surgeon    6. Wear comfortable clothes.  Wear glasses instead of contacts.  Do not bring any money or jewelry. Please bring picture ID, insurance card, and any prearranged co-payment or hospital payment.  Do not wear make-up, particularly mascara the morning of your surgery.  Do not wear nail polish, particularly if you are having foot /hand

## 2024-04-07 LAB
EKG ATRIAL RATE: 83 BPM
EKG DIAGNOSIS: NORMAL
EKG P AXIS: 29 DEGREES
EKG P-R INTERVAL: 164 MS
EKG Q-T INTERVAL: 342 MS
EKG QRS DURATION: 98 MS
EKG QTC CALCULATION (BAZETT): 401 MS
EKG R AXIS: 2 DEGREES
EKG T AXIS: 58 DEGREES
EKG VENTRICULAR RATE: 83 BPM

## 2024-04-11 ENCOUNTER — HOSPITAL ENCOUNTER (OUTPATIENT)
Facility: HOSPITAL | Age: 59
Setting detail: OUTPATIENT SURGERY
Discharge: HOME | End: 2024-04-11
Attending: SURGERY | Admitting: SURGERY
Payer: COMMERCIAL

## 2024-04-11 ENCOUNTER — ANESTHESIA EVENT (OUTPATIENT)
Facility: HOSPITAL | Age: 59
End: 2024-04-11
Payer: COMMERCIAL

## 2024-04-11 ENCOUNTER — ANESTHESIA (OUTPATIENT)
Facility: HOSPITAL | Age: 59
End: 2024-04-11
Payer: COMMERCIAL

## 2024-04-11 VITALS
RESPIRATION RATE: 14 BRPM | DIASTOLIC BLOOD PRESSURE: 82 MMHG | HEIGHT: 70 IN | HEART RATE: 63 BPM | OXYGEN SATURATION: 97 % | BODY MASS INDEX: 29.35 KG/M2 | WEIGHT: 205.03 LBS | TEMPERATURE: 97.8 F | SYSTOLIC BLOOD PRESSURE: 115 MMHG

## 2024-04-11 DIAGNOSIS — K40.20 NON-RECURRENT BILATERAL INGUINAL HERNIA WITHOUT OBSTRUCTION OR GANGRENE: Primary | ICD-10-CM

## 2024-04-11 LAB
GLUCOSE BLD STRIP.AUTO-MCNC: 116 MG/DL (ref 65–117)
GLUCOSE BLD STRIP.AUTO-MCNC: 167 MG/DL (ref 65–117)
SERVICE CMNT-IMP: ABNORMAL
SERVICE CMNT-IMP: NORMAL

## 2024-04-11 PROCEDURE — 2580000003 HC RX 258: Performed by: ANESTHESIOLOGY

## 2024-04-11 PROCEDURE — 6360000002 HC RX W HCPCS: Performed by: SURGERY

## 2024-04-11 PROCEDURE — 3600000019 HC SURGERY ROBOT ADDTL 15MIN: Performed by: SURGERY

## 2024-04-11 PROCEDURE — 2580000003 HC RX 258: Performed by: SURGERY

## 2024-04-11 PROCEDURE — 6360000002 HC RX W HCPCS: Performed by: NURSE ANESTHETIST, CERTIFIED REGISTERED

## 2024-04-11 PROCEDURE — 6360000002 HC RX W HCPCS: Performed by: STUDENT IN AN ORGANIZED HEALTH CARE EDUCATION/TRAINING PROGRAM

## 2024-04-11 PROCEDURE — C1781 MESH (IMPLANTABLE): HCPCS | Performed by: SURGERY

## 2024-04-11 PROCEDURE — 2500000003 HC RX 250 WO HCPCS: Performed by: NURSE ANESTHETIST, CERTIFIED REGISTERED

## 2024-04-11 PROCEDURE — 82962 GLUCOSE BLOOD TEST: CPT

## 2024-04-11 PROCEDURE — 7100000000 HC PACU RECOVERY - FIRST 15 MIN: Performed by: SURGERY

## 2024-04-11 PROCEDURE — 3600000009 HC SURGERY ROBOT BASE: Performed by: SURGERY

## 2024-04-11 PROCEDURE — 7100000001 HC PACU RECOVERY - ADDTL 15 MIN: Performed by: SURGERY

## 2024-04-11 PROCEDURE — 2709999900 HC NON-CHARGEABLE SUPPLY: Performed by: SURGERY

## 2024-04-11 PROCEDURE — 49650 LAP ING HERNIA REPAIR INIT: CPT | Performed by: SURGERY

## 2024-04-11 PROCEDURE — 3700000001 HC ADD 15 MINUTES (ANESTHESIA): Performed by: SURGERY

## 2024-04-11 PROCEDURE — S2900 ROBOTIC SURGICAL SYSTEM: HCPCS | Performed by: SURGERY

## 2024-04-11 PROCEDURE — 3700000000 HC ANESTHESIA ATTENDED CARE: Performed by: SURGERY

## 2024-04-11 DEVICE — LAPAROSCOPIC SELF-FIXATING MESH, LEFT ANATOMICAL
Type: IMPLANTABLE DEVICE | Site: INGUINAL | Status: FUNCTIONAL
Brand: PROGRIP

## 2024-04-11 DEVICE — LAPAROSCOPIC SELF-FIXATING MESH, RIGHT ANATOMICAL
Type: IMPLANTABLE DEVICE | Site: INGUINAL | Status: FUNCTIONAL
Brand: PROGRIP

## 2024-04-11 RX ORDER — DEXAMETHASONE SODIUM PHOSPHATE 4 MG/ML
INJECTION, SOLUTION INTRA-ARTICULAR; INTRALESIONAL; INTRAMUSCULAR; INTRAVENOUS; SOFT TISSUE PRN
Status: DISCONTINUED | OUTPATIENT
Start: 2024-04-11 | End: 2024-04-11 | Stop reason: SDUPTHER

## 2024-04-11 RX ORDER — SODIUM CHLORIDE 0.9 % (FLUSH) 0.9 %
5-40 SYRINGE (ML) INJECTION EVERY 12 HOURS SCHEDULED
Status: DISCONTINUED | OUTPATIENT
Start: 2024-04-11 | End: 2024-04-11 | Stop reason: HOSPADM

## 2024-04-11 RX ORDER — PROPOFOL 10 MG/ML
INJECTION, EMULSION INTRAVENOUS PRN
Status: DISCONTINUED | OUTPATIENT
Start: 2024-04-11 | End: 2024-04-11 | Stop reason: SDUPTHER

## 2024-04-11 RX ORDER — BUPIVACAINE HYDROCHLORIDE 5 MG/ML
INJECTION, SOLUTION EPIDURAL; INTRACAUDAL PRN
Status: DISCONTINUED | OUTPATIENT
Start: 2024-04-11 | End: 2024-04-11 | Stop reason: ALTCHOICE

## 2024-04-11 RX ORDER — POLYETHYLENE GLYCOL 3350 17 G/17G
17 POWDER, FOR SOLUTION ORAL DAILY
Qty: 116 G | Refills: 0 | Status: SHIPPED | OUTPATIENT
Start: 2024-04-11 | End: 2024-04-18

## 2024-04-11 RX ORDER — FENTANYL CITRATE 50 UG/ML
25 INJECTION, SOLUTION INTRAMUSCULAR; INTRAVENOUS EVERY 5 MIN PRN
Status: COMPLETED | OUTPATIENT
Start: 2024-04-11 | End: 2024-04-11

## 2024-04-11 RX ORDER — SODIUM CHLORIDE, SODIUM LACTATE, POTASSIUM CHLORIDE, CALCIUM CHLORIDE 600; 310; 30; 20 MG/100ML; MG/100ML; MG/100ML; MG/100ML
INJECTION, SOLUTION INTRAVENOUS CONTINUOUS
Status: DISCONTINUED | OUTPATIENT
Start: 2024-04-11 | End: 2024-04-11 | Stop reason: HOSPADM

## 2024-04-11 RX ORDER — DEXTROSE MONOHYDRATE 100 MG/ML
INJECTION, SOLUTION INTRAVENOUS CONTINUOUS PRN
Status: DISCONTINUED | OUTPATIENT
Start: 2024-04-11 | End: 2024-04-11 | Stop reason: HOSPADM

## 2024-04-11 RX ORDER — SODIUM CHLORIDE 9 MG/ML
INJECTION, SOLUTION INTRAVENOUS PRN
Status: DISCONTINUED | OUTPATIENT
Start: 2024-04-11 | End: 2024-04-11 | Stop reason: HOSPADM

## 2024-04-11 RX ORDER — IBUPROFEN 600 MG/1
600 TABLET ORAL
Qty: 20 TABLET | Refills: 0 | Status: SHIPPED | OUTPATIENT
Start: 2024-04-11

## 2024-04-11 RX ORDER — KETOROLAC TROMETHAMINE 30 MG/ML
30 INJECTION, SOLUTION INTRAMUSCULAR; INTRAVENOUS
Status: COMPLETED | OUTPATIENT
Start: 2024-04-11 | End: 2024-04-11

## 2024-04-11 RX ORDER — FENTANYL CITRATE 50 UG/ML
INJECTION, SOLUTION INTRAMUSCULAR; INTRAVENOUS PRN
Status: DISCONTINUED | OUTPATIENT
Start: 2024-04-11 | End: 2024-04-11 | Stop reason: SDUPTHER

## 2024-04-11 RX ORDER — HYDROMORPHONE HYDROCHLORIDE 1 MG/ML
0.5 INJECTION, SOLUTION INTRAMUSCULAR; INTRAVENOUS; SUBCUTANEOUS EVERY 5 MIN PRN
Status: DISCONTINUED | OUTPATIENT
Start: 2024-04-11 | End: 2024-04-11 | Stop reason: HOSPADM

## 2024-04-11 RX ORDER — MIDAZOLAM HYDROCHLORIDE 1 MG/ML
INJECTION INTRAMUSCULAR; INTRAVENOUS PRN
Status: DISCONTINUED | OUTPATIENT
Start: 2024-04-11 | End: 2024-04-11 | Stop reason: SDUPTHER

## 2024-04-11 RX ORDER — ONDANSETRON 2 MG/ML
INJECTION INTRAMUSCULAR; INTRAVENOUS PRN
Status: DISCONTINUED | OUTPATIENT
Start: 2024-04-11 | End: 2024-04-11 | Stop reason: SDUPTHER

## 2024-04-11 RX ORDER — IPRATROPIUM BROMIDE AND ALBUTEROL SULFATE 2.5; .5 MG/3ML; MG/3ML
1 SOLUTION RESPIRATORY (INHALATION)
Status: DISCONTINUED | OUTPATIENT
Start: 2024-04-11 | End: 2024-04-11 | Stop reason: HOSPADM

## 2024-04-11 RX ORDER — HYDROCODONE BITARTRATE AND ACETAMINOPHEN 5; 325 MG/1; MG/1
1 TABLET ORAL EVERY 6 HOURS PRN
Qty: 20 TABLET | Refills: 0 | Status: SHIPPED | OUTPATIENT
Start: 2024-04-11 | End: 2024-04-16

## 2024-04-11 RX ORDER — DEXMEDETOMIDINE HYDROCHLORIDE 100 UG/ML
INJECTION, SOLUTION INTRAVENOUS PRN
Status: DISCONTINUED | OUTPATIENT
Start: 2024-04-11 | End: 2024-04-11 | Stop reason: SDUPTHER

## 2024-04-11 RX ORDER — GLYCOPYRROLATE 0.2 MG/ML
INJECTION INTRAMUSCULAR; INTRAVENOUS PRN
Status: DISCONTINUED | OUTPATIENT
Start: 2024-04-11 | End: 2024-04-11 | Stop reason: SDUPTHER

## 2024-04-11 RX ORDER — LIDOCAINE HYDROCHLORIDE 20 MG/ML
INJECTION, SOLUTION EPIDURAL; INFILTRATION; INTRACAUDAL; PERINEURAL PRN
Status: DISCONTINUED | OUTPATIENT
Start: 2024-04-11 | End: 2024-04-11 | Stop reason: SDUPTHER

## 2024-04-11 RX ORDER — SODIUM CHLORIDE 0.9 % (FLUSH) 0.9 %
5-40 SYRINGE (ML) INJECTION PRN
Status: DISCONTINUED | OUTPATIENT
Start: 2024-04-11 | End: 2024-04-11 | Stop reason: HOSPADM

## 2024-04-11 RX ORDER — ROCURONIUM BROMIDE 10 MG/ML
INJECTION, SOLUTION INTRAVENOUS PRN
Status: DISCONTINUED | OUTPATIENT
Start: 2024-04-11 | End: 2024-04-11 | Stop reason: SDUPTHER

## 2024-04-11 RX ORDER — HYDROCODONE BITARTRATE AND ACETAMINOPHEN 5; 325 MG/1; MG/1
1 TABLET ORAL
Status: DISCONTINUED | OUTPATIENT
Start: 2024-04-11 | End: 2024-04-11 | Stop reason: HOSPADM

## 2024-04-11 RX ORDER — NEOSTIGMINE METHYLSULFATE 1 MG/ML
INJECTION, SOLUTION INTRAVENOUS PRN
Status: DISCONTINUED | OUTPATIENT
Start: 2024-04-11 | End: 2024-04-11 | Stop reason: SDUPTHER

## 2024-04-11 RX ORDER — GLUCAGON 1 MG/ML
1 KIT INJECTION PRN
Status: DISCONTINUED | OUTPATIENT
Start: 2024-04-11 | End: 2024-04-11 | Stop reason: HOSPADM

## 2024-04-11 RX ORDER — ONDANSETRON 2 MG/ML
4 INJECTION INTRAMUSCULAR; INTRAVENOUS
Status: COMPLETED | OUTPATIENT
Start: 2024-04-11 | End: 2024-04-11

## 2024-04-11 RX ORDER — NALOXONE HYDROCHLORIDE 0.4 MG/ML
INJECTION, SOLUTION INTRAMUSCULAR; INTRAVENOUS; SUBCUTANEOUS PRN
Status: DISCONTINUED | OUTPATIENT
Start: 2024-04-11 | End: 2024-04-11 | Stop reason: HOSPADM

## 2024-04-11 RX ORDER — PROCHLORPERAZINE EDISYLATE 5 MG/ML
5 INJECTION INTRAMUSCULAR; INTRAVENOUS
Status: DISCONTINUED | OUTPATIENT
Start: 2024-04-11 | End: 2024-04-11 | Stop reason: HOSPADM

## 2024-04-11 RX ADMIN — SODIUM CHLORIDE, POTASSIUM CHLORIDE, SODIUM LACTATE AND CALCIUM CHLORIDE: 600; 310; 30; 20 INJECTION, SOLUTION INTRAVENOUS at 14:56

## 2024-04-11 RX ADMIN — ROCURONIUM BROMIDE 50 MG: 10 INJECTION INTRAVENOUS at 15:01

## 2024-04-11 RX ADMIN — FENTANYL CITRATE 25 MCG: 50 INJECTION INTRAMUSCULAR; INTRAVENOUS at 16:34

## 2024-04-11 RX ADMIN — FENTANYL CITRATE 25 MCG: 50 INJECTION INTRAMUSCULAR; INTRAVENOUS at 16:54

## 2024-04-11 RX ADMIN — ONDANSETRON 4 MG: 2 INJECTION INTRAMUSCULAR; INTRAVENOUS at 16:15

## 2024-04-11 RX ADMIN — SODIUM CHLORIDE, POTASSIUM CHLORIDE, SODIUM LACTATE AND CALCIUM CHLORIDE: 600; 310; 30; 20 INJECTION, SOLUTION INTRAVENOUS at 13:30

## 2024-04-11 RX ADMIN — DEXAMETHASONE SODIUM PHOSPHATE 8 MG: 4 INJECTION, SOLUTION INTRAMUSCULAR; INTRAVENOUS at 15:20

## 2024-04-11 RX ADMIN — ONDANSETRON 4 MG: 2 INJECTION INTRAMUSCULAR; INTRAVENOUS at 17:24

## 2024-04-11 RX ADMIN — LIDOCAINE HYDROCHLORIDE 100 MG: 20 INJECTION, SOLUTION EPIDURAL; INFILTRATION; INTRACAUDAL; PERINEURAL at 15:00

## 2024-04-11 RX ADMIN — MIDAZOLAM HYDROCHLORIDE 2 MG: 1 INJECTION, SOLUTION INTRAMUSCULAR; INTRAVENOUS at 14:56

## 2024-04-11 RX ADMIN — FENTANYL CITRATE 25 MCG: 50 INJECTION INTRAMUSCULAR; INTRAVENOUS at 17:00

## 2024-04-11 RX ADMIN — ROCURONIUM BROMIDE 10 MG: 10 INJECTION INTRAVENOUS at 15:54

## 2024-04-11 RX ADMIN — FENTANYL CITRATE 50 MCG: 50 INJECTION, SOLUTION INTRAMUSCULAR; INTRAVENOUS at 15:00

## 2024-04-11 RX ADMIN — GLYCOPYRROLATE 0.4 MG: 0.2 INJECTION INTRAMUSCULAR; INTRAVENOUS at 16:15

## 2024-04-11 RX ADMIN — Medication 3 MG: at 16:15

## 2024-04-11 RX ADMIN — FENTANYL CITRATE 50 MCG: 50 INJECTION, SOLUTION INTRAMUSCULAR; INTRAVENOUS at 15:08

## 2024-04-11 RX ADMIN — FENTANYL CITRATE 25 MCG: 50 INJECTION INTRAMUSCULAR; INTRAVENOUS at 16:42

## 2024-04-11 RX ADMIN — PROPOFOL 170 MG: 10 INJECTION, EMULSION INTRAVENOUS at 15:00

## 2024-04-11 RX ADMIN — WATER 2000 MG: 1 INJECTION INTRAMUSCULAR; INTRAVENOUS; SUBCUTANEOUS at 15:20

## 2024-04-11 RX ADMIN — KETOROLAC TROMETHAMINE 30 MG: 30 INJECTION, SOLUTION INTRAMUSCULAR at 17:21

## 2024-04-11 RX ADMIN — DEXMEDETOMIDINE HYDROCHLORIDE 10 MCG: 100 INJECTION, SOLUTION, CONCENTRATE INTRAVENOUS at 16:14

## 2024-04-11 ASSESSMENT — PAIN DESCRIPTION - LOCATION
LOCATION: ABDOMEN

## 2024-04-11 ASSESSMENT — PAIN DESCRIPTION - DESCRIPTORS
DESCRIPTORS: SORE
DESCRIPTORS: OTHER (COMMENT)
DESCRIPTORS: ACHING
DESCRIPTORS: OTHER (COMMENT)

## 2024-04-11 ASSESSMENT — PAIN DESCRIPTION - ORIENTATION: ORIENTATION: MID

## 2024-04-11 ASSESSMENT — PAIN - FUNCTIONAL ASSESSMENT: PAIN_FUNCTIONAL_ASSESSMENT: 0-10

## 2024-04-11 ASSESSMENT — PAIN SCALES - GENERAL
PAINLEVEL_OUTOF10: 5
PAINLEVEL_OUTOF10: 8
PAINLEVEL_OUTOF10: 6
PAINLEVEL_OUTOF10: 5
PAINLEVEL_OUTOF10: 7

## 2024-04-11 NOTE — PERIOP NOTE
13:00= ambulated independently to Pre OP; A&Ox4, consents verified (2); changed into gown with NO CHG; voided in BR; mepilex dsg applied to sacrum; no erythema or skin breakdown noted; skin CDI; declined warming blanket applied.     13:33= ready for OR; music playing, lights dimmed, call bell in reach.

## 2024-04-11 NOTE — ANESTHESIA POSTPROCEDURE EVALUATION
Department of Anesthesiology  Postprocedure Note    Patient: Iban Copeland  MRN: [id number]  YOB: 1965  Date of evaluation: 4/11/2024    Procedure Summary       Date: 04/11/24 Room / Location: [facility] MAIN OR M1 / [facility] MAIN OR    Anesthesia Start: 1456 Anesthesia Stop: 1627    Procedure: ROBOTIC REPAIR OF BILATERAL INGUINAL HERNIA WITH MESH (Bilateral: Abdomen) Diagnosis:       Bilateral inguinal hernia      (Bilateral inguinal hernia [K40.20])    Providers: George Bartholomew MD Responsible Provider: Prem Fuller MD    Anesthesia Type: General ASA Status: 2            Anesthesia Type: General    Shantel Phase I: Shantel Score: 10    Shantel Phase II:      Anesthesia Post Evaluation    Patient location during evaluation: PACU  Patient participation: complete - patient participated  Level of consciousness: sleepy but conscious and responsive to verbal stimuli  Pain score: 2  Airway patency: patent  Nausea & Vomiting: no vomiting and no nausea  Cardiovascular status: blood pressure returned to baseline and hemodynamically stable  Respiratory status: acceptable  Hydration status: stable  Multimodal analgesia pain management approach  Pain management: adequate    No notable events documented.

## 2024-04-11 NOTE — FLOWSHEET NOTE
04/11/24 1734   Discharge Checklist   Ride and Caregiver Arranged Yes   Ride Caregiver Provider Janneth   Phone Number for Ride/Caregiver [phone]   Responsible party will drive the patient home Yes   Mobility at Departure Wheelchair   Discharged with Documented Belongings Yes   Departure Mode Significant other   AVS Reviewed   AVS & discharge instructions reviewed with patient and/or representative? Yes   Reviewed instructions with Patient;Other (name and relationship in comment)  (Janneth [phone])   Level of Understanding Questions answered;Verbalized understanding

## 2024-04-11 NOTE — FLOWSHEET NOTE
04/11/24 1627   Handoff   Communication Given Periop Handoff/Relief   Handoff phase Phase I receiving   Handoff Given To Helga MERAZ   Handoff Received From Ruth MERAZ & Viv MERAZ   Handoff Communication Face to Face;At bedside

## 2024-04-11 NOTE — H&P
H&P    Assessment:   Bilateral inguinal hernia [K40.20]    Body mass index is 29.84 kg/m².    Plan:   ROBOTIC REPAIR OF BILATERAL INGUINAL HERNIA WITH MESH (Bilateral: Abdomen) Discussed the risk of surgery including bleeding, infection, injury to adjacent structures, and the risks of general anesthetic.  The patient understands the risks; any and all questions were answered to the patient's satisfaction.    The patient was counseled at length about the risks of jenny Covid-19 during their perioperative period and any recovery window from their procedure.  The patient was made aware that jenny Covid-19  may worsen their prognosis for recovering from their procedure and lend to a higher morbidity and/or mortality risk.  All material risks, benefits, and reasonable alternatives including postponing the procedure were discussed. The patient wishes to proceed with the procedure at this time.    Subjective:      Iban Copeland is a 59 y.o. male who presents for above procedure.    Objective:   Blood pressure 110/64, pulse 65, temperature 97.9 °F (36.6 °C), temperature source Oral, resp. rate 21, height 1.765 m (5' 9.5\"), weight 93 kg (205 lb 0.4 oz), SpO2 100 %.  Temp (24hrs), Av.2 °F (36.8 °C), Min:97.9 °F (36.6 °C), Max:98.5 °F (36.9 °C)      Physical Exam:  PHYSICAL EXAM:    Chest:   [x]   CTA bialterally, no wheezing/rhonchi/rales/crackles    []   wheezing     []   rhonchi     []   crackles     []   use of accessory muscles    Heart:  [x]   regular rate and rhythm     [x]   No murmurs/rubs/gallops    []   irregular rhythm     []   Murmur     []   Rubs     []   Gallops    bih     Past Medical History:   Diagnosis Date    Espitia esophagus     Diabetes mellitus (HCC)     Hyperlipidemia     Hypertension     Seasonal allergic rhinitis      Past Surgical History:   Procedure Laterality Date    CATARACT REMOVAL Right 2018    CATARACT REMOVAL Left     COLONOSCOPY  2020    Dr. Sanders. 2 rectal

## 2024-04-11 NOTE — OP NOTE
DATE OF PROCEDURE:  4/11/2024    SURGEON: George Bartholomew MD     PREOPERATIVE DIAGNOSIS: Symptomatic right and left inguinal hernias.     POSTOPERATIVE DIAGNOSIS: Same    OPERATIVE PROCEDURE: Robot-assisted laparoscopic right and left inguinal hernia repairs with mesh (CPT 33452, modifier 50)    ANESTHESIA: General endotracheal.     ESTIMATED BLOOD LOSS: Minimal.     IMPLANTS:     Implant Name Type Inv. Item Serial No.  Lot No. LRB No. Used Action   MESH MARILIA [id number] TEXTILE MFIL POLYETH TEREPHTHALATE - [id number]  MESH MARILIA [id number] TEXTILE MFIL POLYETH TEREPHTHALATE  [employer]  SURGICAL-WD [id number] Right 1 Implanted   MESH MARILIA LAP SELF FIXATING LT CHAD POLYLACTIC ACID PROGRIP - [id number]  MESH MARILIA LAP SELF FIXATING LT CHAD POLYLACTIC ACID PROGRIP  [employer] COVID[employer]EN  SURGICAL-WD [id number] Left 1 Implanted       SPECIMENS:  * No specimens in log *     INDICATIONS: Groin pain with bulge.    FINDINGS: Indirect right inguinal hernia containing the appendix but not incarcerated, left indirect inguinal hernia containing sigmoid colon but not incarcerated.    DESCRIPTION OF THE PROCEDURE: After obtaining informed consent, the patient was taken to the operating room and placed supine on the operating table. An operative timeout was performed, and general endotracheal anesthesia was induced. Preoperative antibiotics were administered, and the abdomen was prepped and draped in the usual sterile fashion. An Ioban was employed.     The abdomen was then entered just above the umbilicus using an 8 mm optical trocar.  The abdomen was insufflated without incident and was visually explored.  No other gross pathology was visible.  Under direct vision 2 additional robotic ports were placed one on either side of the first.  Local anesthetic was used at each site prior to placement.      The robot was then docked with the patient in Trendelenburg position.  Using an atraumatic grasper and electrified

## 2024-04-11 NOTE — DISCHARGE INSTRUCTIONS
to get too much acetaminophen. This can cause serious liver problems. Look carefully on the package of all your medicines to see if acetaminophen is included. Ask your pharmacist which medicines you can take safely.  Some patients have serious side effects from this medicine. Ask your pharmacist to show you the information from the Food and Drug Administration (FDA) and discuss it with you.  Side Effects  The following is a list of some common side effects from this medicine. Please speak with your doctor about what you should do if you experience these or other side effects.  constipation  dizziness or drowsiness  lack of energy and tiredness  itching  nausea and vomiting  red, burning, or itchy skin  stomach upset or abdominal pain  If you have any of the following side effects, you may be getting too much medicine. Please contact your doctor to let them know about these side effects.  changes in memory, mood, or thinking  difficulty concentrating  confusion  fainting  slow heartbeat  low blood pressure  muscle weakness  blurring or changes of vision  Call your doctor or get medical help right away if you notice any of these more serious side effects:  decreased awareness or responsiveness  breathing interruption during sleep  shallow, irregular breathing  chest pain  hallucinations (unusual thoughts, seeing or hearing things that are not real)  signs of liver damage (such as yellowing of eye or skin, dark urine, or unusual tiredness)  seizures  shortness of breath  light colored stool  difficulty or discomfort urinating  severe or persistent vomiting  A few people may have an allergic reaction to this medicine. Symptoms can include difficulty breathing, skin rash, itching, swelling, or severe dizziness. If you notice any of these symptoms, seek medical help quickly.  Please speak with your doctor, nurse, or pharmacist if you have any questions about this medicine.  IMPORTANT NOTE: This document tells you briefly

## 2024-04-11 NOTE — ANESTHESIA PRE PROCEDURE
Department of Anesthesiology  Preprocedure Note       Name:  Iban Copeland   Age:  59 y.o.  :  1965                                          MRN:  [id number]         Date:  2024      Surgeon: Surgeon(s):  George Bartholomew MD    Procedure: Procedure(s):  ROBOTIC REPAIR OF BILATERAL INGUINAL HERNIA WITH MESH    Medications prior to admission:   Prior to Admission medications    Medication Sig Start Date End Date Taking? Authorizing Provider   simvastatin (ZOCOR) 10 MG tablet TAKE 1 TABLET BY MOUTH EVERY NIGHT 1/15/24   George Richardson MD   lisinopril (PRINIVIL;ZESTRIL) 10 MG tablet Take 1 tablet by mouth daily 23   George Richardson MD   metFORMIN (GLUCOPHAGE-XR) 500 MG extended release tablet Take 1 tablet by mouth Daily with supper 23   George Richardson MD   albuterol sulfate HFA (PROVENTIL;VENTOLIN;PROAIR) 108 (90 Base) MCG/ACT inhaler Inhale 2 puffs into the lungs every 6 hours as needed  Patient not taking: Reported on 1/15/2024 9/23/21   Automatic Reconciliation, Ar   pantoprazole (PROTONIX) 40 MG tablet Take 1 tablet by mouth every morning 21   Automatic Reconciliation, Ar       Current medications:    Current Facility-Administered Medications   Medication Dose Route Frequency Provider Last Rate Last Admin    lactated ringers IV soln infusion   IntraVENous Continuous Prem Fuller MD 25 mL/hr at 24 1330 New Bag at 24 1330    ceFAZolin (ANCEF) 2,000 mg in sterile water 20 mL IV syringe  2,000 mg IntraVENous Once George Bartholomew MD           Allergies:    Allergies   Allergen Reactions    Latex Rash    Molds & Smuts     Pollen Extract        Problem List:    Patient Active Problem List   Diagnosis Code    Type 2 diabetes mellitus without complication, without long-term current use of insulin (HCC) E11.9    Elevated blood pressure reading without diagnosis of hypertension R03.0    Abdominal wall hernia K43.9    Seasonal allergic rhinitis due to

## 2024-05-03 ENCOUNTER — OFFICE VISIT (OUTPATIENT)
Age: 59
End: 2024-05-03

## 2024-05-03 VITALS
SYSTOLIC BLOOD PRESSURE: 132 MMHG | HEART RATE: 70 BPM | BODY MASS INDEX: 29.92 KG/M2 | DIASTOLIC BLOOD PRESSURE: 71 MMHG | TEMPERATURE: 98 F | OXYGEN SATURATION: 96 % | WEIGHT: 209 LBS | HEIGHT: 70 IN

## 2024-05-03 DIAGNOSIS — Z48.89 POSTOPERATIVE VISIT: Primary | ICD-10-CM

## 2024-05-03 PROCEDURE — 99024 POSTOP FOLLOW-UP VISIT: CPT | Performed by: SURGERY

## 2024-05-03 ASSESSMENT — PATIENT HEALTH QUESTIONNAIRE - PHQ9
SUM OF ALL RESPONSES TO PHQ QUESTIONS 1-9: 0
1. LITTLE INTEREST OR PLEASURE IN DOING THINGS: NOT AT ALL
SUM OF ALL RESPONSES TO PHQ QUESTIONS 1-9: 0
SUM OF ALL RESPONSES TO PHQ9 QUESTIONS 1 & 2: 0
2. FEELING DOWN, DEPRESSED OR HOPELESS: NOT AT ALL
SUM OF ALL RESPONSES TO PHQ QUESTIONS 1-9: 0
SUM OF ALL RESPONSES TO PHQ QUESTIONS 1-9: 0

## 2024-05-03 NOTE — PROGRESS NOTES
Identified pt with two pt identifiers (name and ). Reviewed chart in preparation for visit and have obtained necessary documentation.    Iban Copeland is a 59 y.o. male  Chief Complaint   Patient presents with    Post-Op Check     SP  Robot-assisted laparoscopic right and left inguinal hernia repairs with mesh 2024     /71 (Site: Right Upper Arm, Position: Sitting, Cuff Size: Large Adult)   Pulse 70   Temp 98 °F (36.7 °C) (Oral)   Ht 1.765 m (5' 9.5\")   Wt 94.8 kg (209 lb)   SpO2 96%   BMI 30.42 kg/m²     1. Have you been to the ER, urgent care clinic since your last visit?  Hospitalized since your last visit?no    2. Have you seen or consulted any other health care providers outside of the [facility] since your last visit?  Include any pap smears or colon screening. no

## 2024-05-03 NOTE — PROGRESS NOTES
Status post robot-assisted laparoscopic repair of bilateral inguinal hernias with mesh on 4/11/2024.  The right hernia contained the appendix and the left containing sigmoid colon but fortunately neither were incarcerated.    He is doing very well.  He is still sore and swollen on both sides but it is getting better every day.  No complaints.    On exam, the incisions are well-healed and the repairs are intact.  Both cords are mildly swollen and mildly tender but there are no concerning findings.    Assessment plan: Appropriate recovery.  Reminded of activity restrictions through 6 weeks from surgery.  Told to call with any concerns.  I recommended that he make a follow-up appointment for a month from now but if everything feels great at that time he can always call and cancel.     Thanks.

## 2024-06-14 ENCOUNTER — OFFICE VISIT (OUTPATIENT)
Age: 59
End: 2024-06-14

## 2024-06-14 VITALS
RESPIRATION RATE: 20 BRPM | DIASTOLIC BLOOD PRESSURE: 60 MMHG | OXYGEN SATURATION: 99 % | SYSTOLIC BLOOD PRESSURE: 134 MMHG | HEART RATE: 74 BPM | BODY MASS INDEX: 30.81 KG/M2 | HEIGHT: 69 IN | WEIGHT: 208 LBS | TEMPERATURE: 97.4 F

## 2024-06-14 DIAGNOSIS — E11.9 TYPE 2 DIABETES MELLITUS WITHOUT COMPLICATION, WITHOUT LONG-TERM CURRENT USE OF INSULIN (HCC): ICD-10-CM

## 2024-06-14 DIAGNOSIS — I10 ESSENTIAL HYPERTENSION: Primary | ICD-10-CM

## 2024-06-14 DIAGNOSIS — K22.70 BARRETT'S ESOPHAGUS WITHOUT DYSPLASIA: ICD-10-CM

## 2024-06-14 DIAGNOSIS — Z11.4 SCREENING FOR HIV (HUMAN IMMUNODEFICIENCY VIRUS): ICD-10-CM

## 2024-06-14 DIAGNOSIS — E78.2 MIXED HYPERLIPIDEMIA: ICD-10-CM

## 2024-06-14 RX ORDER — PANTOPRAZOLE SODIUM 40 MG/1
40 TABLET, DELAYED RELEASE ORAL EVERY MORNING
Qty: 90 TABLET | Refills: 1 | Status: SHIPPED | OUTPATIENT
Start: 2024-06-14

## 2024-06-14 SDOH — ECONOMIC STABILITY: INCOME INSECURITY: HOW HARD IS IT FOR YOU TO PAY FOR THE VERY BASICS LIKE FOOD, HOUSING, MEDICAL CARE, AND HEATING?: NOT HARD AT ALL

## 2024-06-14 SDOH — ECONOMIC STABILITY: FOOD INSECURITY: WITHIN THE PAST 12 MONTHS, YOU WORRIED THAT YOUR FOOD WOULD RUN OUT BEFORE YOU GOT MONEY TO BUY MORE.: NEVER TRUE

## 2024-06-14 SDOH — ECONOMIC STABILITY: HOUSING INSECURITY
IN THE LAST 12 MONTHS, WAS THERE A TIME WHEN YOU DID NOT HAVE A STEADY PLACE TO SLEEP OR SLEPT IN A SHELTER (INCLUDING NOW)?: NO

## 2024-06-14 SDOH — ECONOMIC STABILITY: FOOD INSECURITY: WITHIN THE PAST 12 MONTHS, THE FOOD YOU BOUGHT JUST DIDN'T LAST AND YOU DIDN'T HAVE MONEY TO GET MORE.: NEVER TRUE

## 2024-06-14 ASSESSMENT — ENCOUNTER SYMPTOMS
EYE PAIN: 0
VOMITING: 0
SORE THROAT: 0
BLOOD IN STOOL: 0
EYE REDNESS: 0
COUGH: 0
TROUBLE SWALLOWING: 0
ABDOMINAL PAIN: 0
SHORTNESS OF BREATH: 0
DIARRHEA: 0
NAUSEA: 0
CONSTIPATION: 0

## 2024-06-14 ASSESSMENT — PATIENT HEALTH QUESTIONNAIRE - PHQ9
SUM OF ALL RESPONSES TO PHQ QUESTIONS 1-9: 0
SUM OF ALL RESPONSES TO PHQ QUESTIONS 1-9: 0
1. LITTLE INTEREST OR PLEASURE IN DOING THINGS: NOT AT ALL
SUM OF ALL RESPONSES TO PHQ9 QUESTIONS 1 & 2: 0
SUM OF ALL RESPONSES TO PHQ QUESTIONS 1-9: 0
2. FEELING DOWN, DEPRESSED OR HOPELESS: NOT AT ALL
SUM OF ALL RESPONSES TO PHQ QUESTIONS 1-9: 0

## 2024-06-14 NOTE — PROGRESS NOTES
\"Have you been to the ER, urgent care clinic since your last visit?  Hospitalized since your last visit?\"    NO    “Have you seen or consulted any other health care providers outside of [facility] since your last visit?”    NO            Click Here for Release of Records Request

## 2024-06-14 NOTE — PROGRESS NOTES
Iban Copeland is a 59 y.o. male who  has a past medical history of Espitia esophagus, Diabetes mellitus (HCC), Hyperlipidemia, Hypertension, and Seasonal allergic rhinitis.     Pt presents for CPE. Due for foot exam.     C/o ongoing allergic rhinitis. Flonase not working as well recently. Has chronic cough due to it. He plans to start azelastine nasal spray.    Acute complaint of mass in groin area for the last few months. He has some pain with coughing. He has hx of ventral hernia and would like to discuss getting it repaired as well. He has had an ultrasound of it in the past and was told that if it wasn't bothering him he didn't need to get it repaired but recently his girlfriend has been advising him to get it repaired.     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     DMII:  6/13/23 A1c 6.2% with metformin 500 mg daily  12/13/23 A1c 6.5%  FSG: not checking.     Mixed HLD:   Did not tolerate pravastatin  6/13/23  w/o medication.  Started simvastatin   12/13/23 ldl 131, microalb/cr 4    Espitia's: following w GI, Dr. Sanders, getting endoscopies regularly.     Review of Systems   Constitutional:  Negative for chills, fever and unexpected weight change.   HENT:  Negative for congestion, ear pain, hearing loss, sore throat, tinnitus and trouble swallowing.    Eyes:  Negative for pain, redness and visual disturbance.   Respiratory:  Negative for cough and shortness of breath.    Cardiovascular:  Negative for chest pain, palpitations and leg swelling.   Gastrointestinal:  Negative for abdominal pain, blood in stool, constipation, diarrhea, nausea and vomiting.   Endocrine: Negative for polyuria.   Genitourinary:  Negative for decreased urine volume, dysuria, frequency and hematuria.   Musculoskeletal:  Negative for arthralgias and joint swelling.   Skin:  Negative for rash and wound.   Neurological:  Negative for dizziness and headaches.   Psychiatric/Behavioral:  Negative for dysphoric

## 2024-06-15 LAB
ALBUMIN SERPL-MCNC: 3.9 G/DL (ref 3.5–5)
ALBUMIN/GLOB SERPL: 1 (ref 1.1–2.2)
ALP SERPL-CCNC: 90 U/L (ref 45–117)
ALT SERPL-CCNC: 30 U/L (ref 12–78)
ANION GAP SERPL CALC-SCNC: 5 MMOL/L (ref 5–15)
AST SERPL-CCNC: 22 U/L (ref 15–37)
BILIRUB SERPL-MCNC: 0.4 MG/DL (ref 0.2–1)
BUN SERPL-MCNC: 23 MG/DL (ref 6–20)
BUN/CREAT SERPL: 22 (ref 12–20)
CALCIUM SERPL-MCNC: 9.7 MG/DL (ref 8.5–10.1)
CHLORIDE SERPL-SCNC: 103 MMOL/L (ref 97–108)
CHOLEST SERPL-MCNC: 196 MG/DL
CO2 SERPL-SCNC: 29 MMOL/L (ref 21–32)
CREAT SERPL-MCNC: 1.06 MG/DL (ref 0.7–1.3)
EST. AVERAGE GLUCOSE BLD GHB EST-MCNC: 140 MG/DL
GLOBULIN SER CALC-MCNC: 3.9 G/DL (ref 2–4)
GLUCOSE SERPL-MCNC: 137 MG/DL (ref 65–100)
HBA1C MFR BLD: 6.5 % (ref 4–5.6)
HDLC SERPL-MCNC: 46 MG/DL
HDLC SERPL: 4.3 (ref 0–5)
HIV 1+2 AB+HIV1 P24 AG SERPL QL IA: NONREACTIVE
HIV 1/2 RESULT COMMENT: NORMAL
LDLC SERPL CALC-MCNC: 127.6 MG/DL (ref 0–100)
POTASSIUM SERPL-SCNC: 4.4 MMOL/L (ref 3.5–5.1)
PROT SERPL-MCNC: 7.8 G/DL (ref 6.4–8.2)
SODIUM SERPL-SCNC: 137 MMOL/L (ref 136–145)
TRIGL SERPL-MCNC: 112 MG/DL
VLDLC SERPL CALC-MCNC: 22.4 MG/DL

## 2024-07-25 DIAGNOSIS — E78.2 MIXED HYPERLIPIDEMIA: ICD-10-CM

## 2024-07-26 RX ORDER — SIMVASTATIN 10 MG
10 TABLET ORAL NIGHTLY
Qty: 90 TABLET | Refills: 1 | Status: SHIPPED | OUTPATIENT
Start: 2024-07-26

## 2024-08-28 DIAGNOSIS — I10 ESSENTIAL HYPERTENSION: ICD-10-CM

## 2024-08-29 RX ORDER — LISINOPRIL 10 MG/1
10 TABLET ORAL DAILY
Qty: 100 TABLET | Refills: 1 | Status: SHIPPED | OUTPATIENT
Start: 2024-08-29

## 2024-12-13 ENCOUNTER — OFFICE VISIT (OUTPATIENT)
Age: 59
End: 2024-12-13
Payer: COMMERCIAL

## 2024-12-13 VITALS
TEMPERATURE: 98 F | WEIGHT: 209 LBS | SYSTOLIC BLOOD PRESSURE: 139 MMHG | DIASTOLIC BLOOD PRESSURE: 82 MMHG | RESPIRATION RATE: 20 BRPM | HEART RATE: 98 BPM | BODY MASS INDEX: 30.96 KG/M2 | OXYGEN SATURATION: 97 % | HEIGHT: 69 IN

## 2024-12-13 DIAGNOSIS — E11.9 TYPE 2 DIABETES MELLITUS WITHOUT COMPLICATION, WITHOUT LONG-TERM CURRENT USE OF INSULIN (HCC): ICD-10-CM

## 2024-12-13 DIAGNOSIS — Z12.5 SPECIAL SCREENING FOR MALIGNANT NEOPLASM OF PROSTATE: ICD-10-CM

## 2024-12-13 DIAGNOSIS — E78.2 MIXED HYPERLIPIDEMIA: ICD-10-CM

## 2024-12-13 DIAGNOSIS — I10 ESSENTIAL HYPERTENSION: Primary | ICD-10-CM

## 2024-12-13 LAB
ALBUMIN SERPL-MCNC: 3.9 G/DL (ref 3.5–5)
ALBUMIN/GLOB SERPL: 1 (ref 1.1–2.2)
ALP SERPL-CCNC: 116 U/L (ref 45–117)
ALT SERPL-CCNC: 32 U/L (ref 12–78)
ANION GAP SERPL CALC-SCNC: 4 MMOL/L (ref 2–12)
AST SERPL-CCNC: 23 U/L (ref 15–37)
BILIRUB SERPL-MCNC: 0.8 MG/DL (ref 0.2–1)
BUN SERPL-MCNC: 21 MG/DL (ref 6–20)
BUN/CREAT SERPL: 17 (ref 12–20)
CALCIUM SERPL-MCNC: 9.6 MG/DL (ref 8.5–10.1)
CHLORIDE SERPL-SCNC: 99 MMOL/L (ref 97–108)
CHOLEST SERPL-MCNC: 199 MG/DL
CO2 SERPL-SCNC: 31 MMOL/L (ref 21–32)
CREAT SERPL-MCNC: 1.21 MG/DL (ref 0.7–1.3)
EST. AVERAGE GLUCOSE BLD GHB EST-MCNC: 151 MG/DL
GLOBULIN SER CALC-MCNC: 4 G/DL (ref 2–4)
GLUCOSE SERPL-MCNC: 145 MG/DL (ref 65–100)
HBA1C MFR BLD: 6.9 % (ref 4–5.6)
HDLC SERPL-MCNC: 42 MG/DL
HDLC SERPL: 4.7 (ref 0–5)
LDLC SERPL CALC-MCNC: 134.6 MG/DL (ref 0–100)
POTASSIUM SERPL-SCNC: 4.3 MMOL/L (ref 3.5–5.1)
PROT SERPL-MCNC: 7.9 G/DL (ref 6.4–8.2)
PSA SERPL-MCNC: 1.4 NG/ML (ref 0.01–4)
SODIUM SERPL-SCNC: 134 MMOL/L (ref 136–145)
TRIGL SERPL-MCNC: 112 MG/DL
VLDLC SERPL CALC-MCNC: 22.4 MG/DL

## 2024-12-13 PROCEDURE — 99214 OFFICE O/P EST MOD 30 MIN: CPT | Performed by: STUDENT IN AN ORGANIZED HEALTH CARE EDUCATION/TRAINING PROGRAM

## 2024-12-13 PROCEDURE — 3075F SYST BP GE 130 - 139MM HG: CPT | Performed by: STUDENT IN AN ORGANIZED HEALTH CARE EDUCATION/TRAINING PROGRAM

## 2024-12-13 PROCEDURE — 3079F DIAST BP 80-89 MM HG: CPT | Performed by: STUDENT IN AN ORGANIZED HEALTH CARE EDUCATION/TRAINING PROGRAM

## 2024-12-13 PROCEDURE — 3044F HG A1C LEVEL LT 7.0%: CPT | Performed by: STUDENT IN AN ORGANIZED HEALTH CARE EDUCATION/TRAINING PROGRAM

## 2024-12-13 RX ORDER — SIMVASTATIN 10 MG
20 TABLET ORAL NIGHTLY
Qty: 180 TABLET | Refills: 1 | Status: SHIPPED | OUTPATIENT
Start: 2024-12-13

## 2024-12-13 ASSESSMENT — PATIENT HEALTH QUESTIONNAIRE - PHQ9
2. FEELING DOWN, DEPRESSED OR HOPELESS: NOT AT ALL
SUM OF ALL RESPONSES TO PHQ QUESTIONS 1-9: 0
1. LITTLE INTEREST OR PLEASURE IN DOING THINGS: NOT AT ALL
SUM OF ALL RESPONSES TO PHQ QUESTIONS 1-9: 0
SUM OF ALL RESPONSES TO PHQ9 QUESTIONS 1 & 2: 0

## 2024-12-13 NOTE — PROGRESS NOTES
Iban Copeland is a 59 y.o. male     PMH of Espitia's (Dr. Sanders, getting endoscopies regularly. EGD every 3 years), HTN,  controlled DM2, Mixed HLD    Pt presents for follow up.     PSA due 12/13/25  Colonoscopy: due 9/2030    Reports doing well since the last visit, no acute complains.     Underwent EGD with Dr. Sanders.    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     DMII:  6/13/23 A1c 6.2% with metformin 500 mg daily  12/13/23 A1c 6.5%  FSG: not checking.     Mixed HLD:   He eats a lot of beef and pork which he knows is contributing to his cholesterol   Did not tolerate pravastatin - joint pain and ED.  6/13/23  w/o medication.  Started simvastatin   12/13/23 ldl 131, microalb/cr 4        HM:  PSA: 12/13/23          SOCIAL HISTORY: works as a .     Objective:  Last Weight Metrics:      12/13/2024     7:40 AM 6/14/2024     8:40 AM 5/3/2024    10:22 AM 4/11/2024     1:19 PM 4/5/2024     3:04 PM 1/15/2024     4:11 PM 12/13/2023     8:00 AM   Weight Loss Metrics   Height 5' 9.488\" 5' 9.488\" 5' 9.5\" 5' 9.5\" 5' 9.5\" 5' 9\" 5' 9\"   Weight - Scale 209 lbs 208 lbs 209 lbs 205 lbs 209 lbs 3 oz 222 lbs 6 oz 209 lbs   BMI (Calculated) 30.5 kg/m2 30.3 kg/m2 30.5 kg/m2 29.9 kg/m2 30.5 kg/m2 32.9 kg/m2 30.9 kg/m2        /82 (Site: Left Upper Arm, Position: Sitting, Cuff Size: Large Adult)   Pulse 98   Temp 98 °F (36.7 °C) (Temporal)   Resp 20   Ht 1.765 m (5' 9.49\")   Wt 94.8 kg (209 lb)   SpO2 97%   BMI 30.43 kg/m²     Physical Exam  Constitutional:       General: He is not in acute distress.     Appearance: Normal appearance. He is not toxic-appearing.   HENT:      Head: Normocephalic and atraumatic.      Mouth/Throat:      Pharynx: No posterior oropharyngeal erythema.   Eyes:      General: No scleral icterus.     Extraocular Movements: Extraocular movements intact.      Conjunctiva/sclera:      Left eye: Left conjunctiva is not injected.   Cardiovascular:      Rate and

## 2024-12-13 NOTE — PROGRESS NOTES
\"Have you been to the ER, urgent care clinic since your last visit?  Hospitalized since your last visit?\"    NO    “Have you seen or consulted any other health care providers outside our system since your last visit?”    GI/ [facility]      “Have you had a diabetic eye exam?”    2022     Date of last diabetic eye exam: 12/13/2019

## 2025-01-09 DIAGNOSIS — E11.9 TYPE 2 DIABETES MELLITUS WITHOUT COMPLICATION, WITHOUT LONG-TERM CURRENT USE OF INSULIN (HCC): ICD-10-CM

## 2025-01-10 RX ORDER — METFORMIN HYDROCHLORIDE 500 MG/1
500 TABLET, EXTENDED RELEASE ORAL
Qty: 100 TABLET | Refills: 1 | Status: SHIPPED | OUTPATIENT
Start: 2025-01-10

## 2025-04-28 DIAGNOSIS — I10 ESSENTIAL HYPERTENSION: ICD-10-CM

## 2025-04-29 RX ORDER — LISINOPRIL 10 MG/1
10 TABLET ORAL DAILY
Qty: 100 TABLET | Refills: 1 | Status: SHIPPED | OUTPATIENT
Start: 2025-04-29

## 2025-07-01 DIAGNOSIS — E78.2 MIXED HYPERLIPIDEMIA: ICD-10-CM

## 2025-07-02 RX ORDER — SIMVASTATIN 10 MG
20 TABLET ORAL NIGHTLY PRN
Qty: 180 TABLET | Refills: 1 | OUTPATIENT
Start: 2025-07-02

## 2025-07-02 RX ORDER — SIMVASTATIN 20 MG
20 TABLET ORAL NIGHTLY
Qty: 90 TABLET | Refills: 1 | Status: SHIPPED | OUTPATIENT
Start: 2025-07-02

## (undated) DEVICE — SEAL

## (undated) DEVICE — COLUMN DRAPE

## (undated) DEVICE — Z DISCONTINUED USE 2220190 SUTURE VICRYL SZ 3-0 L27IN ABSRB UD L26MM SH 1/2 CIR J416H

## (undated) DEVICE — SOLUTION IRRIG 1000ML STRL H2O USP PLAS POUR BTL

## (undated) DEVICE — PAD PT POS 36 IN SURGYPAD DISP

## (undated) DEVICE — BLADELESS OBTURATOR: Brand: WECK VISTA

## (undated) DEVICE — SOLUTION SURG PREP 26 CC PURPREP

## (undated) DEVICE — ARM DRAPE

## (undated) DEVICE — 3M™ IOBAN™ 2 ANTIMICROBIAL INCISE DRAPE 6650EZ: Brand: IOBAN™ 2

## (undated) DEVICE — BLADE CLIPPER GEN PURP NS

## (undated) DEVICE — HYPODERMIC SAFETY NEEDLE: Brand: MAGELLAN

## (undated) DEVICE — SUTURE MONOCRYL SZ 4-0 L27IN ABSRB UD L19MM PS-2 1/2 CIR PRIM Y426H

## (undated) DEVICE — GLOVE SURG SZ 8 CRM LTX FREE POLYISOPRENE POLYMER BEAD ANTI

## (undated) DEVICE — TOWEL SURG W17XL27IN STD BLU COT NONFENESTRATED PREWASHED

## (undated) DEVICE — KIT,1200CC CANISTER,3/16"X6' TUBING: Brand: MEDLINE INDUSTRIES, INC.

## (undated) DEVICE — SUTURE VICRYL SZ 2-0 L27IN ABSRB UD L26MM SH 1/2 CIR J417H

## (undated) DEVICE — SOLUTION ANTIFOG VIS SYS CLEARIFY LAPSCP

## (undated) DEVICE — LIQUIBAND RAPID ADHESIVE 36/CS 0.8ML: Brand: MEDLINE

## (undated) DEVICE — GENERAL LAPAROSCOPY-MRMC: Brand: MEDLINE INDUSTRIES, INC.

## (undated) DEVICE — COVER,MAYO STAND,STERILE: Brand: MEDLINE

## (undated) DEVICE — GLOVE SURG SZ 85 CRM LTX FREE POLYISOPRENE POLYMER BEAD ANTI

## (undated) DEVICE — SUTURE V-LOC 90 3-0 L9IN ABSRB VLT L26MM V-20 1/2 CIR TAPR VLOCM0644